# Patient Record
Sex: FEMALE | Race: WHITE | NOT HISPANIC OR LATINO | URBAN - METROPOLITAN AREA
[De-identification: names, ages, dates, MRNs, and addresses within clinical notes are randomized per-mention and may not be internally consistent; named-entity substitution may affect disease eponyms.]

---

## 2020-09-25 ENCOUNTER — EMERGENCY (EMERGENCY)
Facility: HOSPITAL | Age: 85
LOS: 0 days | Discharge: HOME | End: 2020-09-26
Attending: EMERGENCY MEDICINE | Admitting: STUDENT IN AN ORGANIZED HEALTH CARE EDUCATION/TRAINING PROGRAM
Payer: MEDICARE

## 2020-09-25 VITALS
SYSTOLIC BLOOD PRESSURE: 187 MMHG | TEMPERATURE: 99 F | OXYGEN SATURATION: 96 % | WEIGHT: 128.09 LBS | RESPIRATION RATE: 18 BRPM | HEIGHT: 59 IN | HEART RATE: 81 BPM | DIASTOLIC BLOOD PRESSURE: 84 MMHG

## 2020-09-25 VITALS
TEMPERATURE: 98 F | HEART RATE: 71 BPM | DIASTOLIC BLOOD PRESSURE: 68 MMHG | OXYGEN SATURATION: 97 % | SYSTOLIC BLOOD PRESSURE: 156 MMHG | RESPIRATION RATE: 18 BRPM

## 2020-09-25 DIAGNOSIS — R74.0 NONSPECIFIC ELEVATION OF LEVELS OF TRANSAMINASE AND LACTIC ACID DEHYDROGENASE [LDH]: ICD-10-CM

## 2020-09-25 DIAGNOSIS — R10.32 LEFT LOWER QUADRANT PAIN: ICD-10-CM

## 2020-09-25 DIAGNOSIS — R10.13 EPIGASTRIC PAIN: ICD-10-CM

## 2020-09-25 DIAGNOSIS — R10.9 UNSPECIFIED ABDOMINAL PAIN: ICD-10-CM

## 2020-09-25 DIAGNOSIS — Z98.890 OTHER SPECIFIED POSTPROCEDURAL STATES: ICD-10-CM

## 2020-09-25 DIAGNOSIS — R11.0 NAUSEA: ICD-10-CM

## 2020-09-25 LAB
ALBUMIN SERPL ELPH-MCNC: 4 G/DL — SIGNIFICANT CHANGE UP (ref 3.5–5.2)
ALP SERPL-CCNC: 244 U/L — HIGH (ref 30–115)
ALT FLD-CCNC: 447 U/L — HIGH (ref 0–41)
ANION GAP SERPL CALC-SCNC: 14 MMOL/L — SIGNIFICANT CHANGE UP (ref 7–14)
APPEARANCE UR: CLEAR — SIGNIFICANT CHANGE UP
AST SERPL-CCNC: 413 U/L — HIGH (ref 0–41)
BASOPHILS # BLD AUTO: 0.02 K/UL — SIGNIFICANT CHANGE UP (ref 0–0.2)
BASOPHILS NFR BLD AUTO: 0.3 % — SIGNIFICANT CHANGE UP (ref 0–1)
BILIRUB SERPL-MCNC: 4.7 MG/DL — HIGH (ref 0.2–1.2)
BILIRUB UR-MCNC: NEGATIVE — SIGNIFICANT CHANGE UP
BUN SERPL-MCNC: 16 MG/DL — SIGNIFICANT CHANGE UP (ref 10–20)
CALCIUM SERPL-MCNC: 9.4 MG/DL — SIGNIFICANT CHANGE UP (ref 8.5–10.1)
CHLORIDE SERPL-SCNC: 95 MMOL/L — LOW (ref 98–110)
CO2 SERPL-SCNC: 21 MMOL/L — SIGNIFICANT CHANGE UP (ref 17–32)
COLOR SPEC: YELLOW — SIGNIFICANT CHANGE UP
CREAT SERPL-MCNC: 0.8 MG/DL — SIGNIFICANT CHANGE UP (ref 0.7–1.5)
DIFF PNL FLD: NEGATIVE — SIGNIFICANT CHANGE UP
EOSINOPHIL # BLD AUTO: 0 K/UL — SIGNIFICANT CHANGE UP (ref 0–0.7)
EOSINOPHIL NFR BLD AUTO: 0 % — SIGNIFICANT CHANGE UP (ref 0–8)
GLUCOSE SERPL-MCNC: 127 MG/DL — HIGH (ref 70–99)
GLUCOSE UR QL: NEGATIVE — SIGNIFICANT CHANGE UP
HCT VFR BLD CALC: 39.3 % — SIGNIFICANT CHANGE UP (ref 37–47)
HGB BLD-MCNC: 13.1 G/DL — SIGNIFICANT CHANGE UP (ref 12–16)
IMM GRANULOCYTES NFR BLD AUTO: 0.3 % — SIGNIFICANT CHANGE UP (ref 0.1–0.3)
KETONES UR-MCNC: SIGNIFICANT CHANGE UP
LACTATE SERPL-SCNC: 1.5 MMOL/L — SIGNIFICANT CHANGE UP (ref 0.7–2)
LEUKOCYTE ESTERASE UR-ACNC: NEGATIVE — SIGNIFICANT CHANGE UP
LIDOCAIN IGE QN: 30 U/L — SIGNIFICANT CHANGE UP (ref 7–60)
LYMPHOCYTES # BLD AUTO: 0.88 K/UL — LOW (ref 1.2–3.4)
LYMPHOCYTES # BLD AUTO: 11.2 % — LOW (ref 20.5–51.1)
MCHC RBC-ENTMCNC: 29.2 PG — SIGNIFICANT CHANGE UP (ref 27–31)
MCHC RBC-ENTMCNC: 33.3 G/DL — SIGNIFICANT CHANGE UP (ref 32–37)
MCV RBC AUTO: 87.7 FL — SIGNIFICANT CHANGE UP (ref 81–99)
MONOCYTES # BLD AUTO: 0.77 K/UL — HIGH (ref 0.1–0.6)
MONOCYTES NFR BLD AUTO: 9.8 % — HIGH (ref 1.7–9.3)
NEUTROPHILS # BLD AUTO: 6.19 K/UL — SIGNIFICANT CHANGE UP (ref 1.4–6.5)
NEUTROPHILS NFR BLD AUTO: 78.4 % — HIGH (ref 42.2–75.2)
NITRITE UR-MCNC: NEGATIVE — SIGNIFICANT CHANGE UP
NRBC # BLD: 0 /100 WBCS — SIGNIFICANT CHANGE UP (ref 0–0)
PH UR: 7 — SIGNIFICANT CHANGE UP (ref 5–8)
PLATELET # BLD AUTO: 241 K/UL — SIGNIFICANT CHANGE UP (ref 130–400)
POTASSIUM SERPL-MCNC: 4.2 MMOL/L — SIGNIFICANT CHANGE UP (ref 3.5–5)
POTASSIUM SERPL-SCNC: 4.2 MMOL/L — SIGNIFICANT CHANGE UP (ref 3.5–5)
PROT SERPL-MCNC: 6.5 G/DL — SIGNIFICANT CHANGE UP (ref 6–8)
PROT UR-MCNC: NEGATIVE — SIGNIFICANT CHANGE UP
RBC # BLD: 4.48 M/UL — SIGNIFICANT CHANGE UP (ref 4.2–5.4)
RBC # FLD: 13.8 % — SIGNIFICANT CHANGE UP (ref 11.5–14.5)
SODIUM SERPL-SCNC: 130 MMOL/L — LOW (ref 135–146)
SP GR SPEC: 1.01 — SIGNIFICANT CHANGE UP (ref 1.01–1.03)
TROPONIN T SERPL-MCNC: <0.01 NG/ML — SIGNIFICANT CHANGE UP
UROBILINOGEN FLD QL: SIGNIFICANT CHANGE UP
WBC # BLD: 7.88 K/UL — SIGNIFICANT CHANGE UP (ref 4.8–10.8)
WBC # FLD AUTO: 7.88 K/UL — SIGNIFICANT CHANGE UP (ref 4.8–10.8)

## 2020-09-25 PROCEDURE — 99285 EMERGENCY DEPT VISIT HI MDM: CPT | Mod: GC

## 2020-09-25 PROCEDURE — 93010 ELECTROCARDIOGRAM REPORT: CPT

## 2020-09-25 PROCEDURE — 71045 X-RAY EXAM CHEST 1 VIEW: CPT | Mod: 26

## 2020-09-25 RX ORDER — SODIUM CHLORIDE 9 MG/ML
1000 INJECTION INTRAMUSCULAR; INTRAVENOUS; SUBCUTANEOUS ONCE
Refills: 0 | Status: COMPLETED | OUTPATIENT
Start: 2020-09-25 | End: 2020-09-25

## 2020-09-25 RX ORDER — KETOROLAC TROMETHAMINE 30 MG/ML
15 SYRINGE (ML) INJECTION ONCE
Refills: 0 | Status: DISCONTINUED | OUTPATIENT
Start: 2020-09-25 | End: 2020-09-25

## 2020-09-25 RX ORDER — ONDANSETRON 8 MG/1
4 TABLET, FILM COATED ORAL ONCE
Refills: 0 | Status: COMPLETED | OUTPATIENT
Start: 2020-09-25 | End: 2020-09-25

## 2020-09-25 RX ORDER — IOHEXOL 300 MG/ML
30 INJECTION, SOLUTION INTRAVENOUS ONCE
Refills: 0 | Status: COMPLETED | OUTPATIENT
Start: 2020-09-25 | End: 2020-09-25

## 2020-09-25 RX ADMIN — SODIUM CHLORIDE 1000 MILLILITER(S): 9 INJECTION INTRAMUSCULAR; INTRAVENOUS; SUBCUTANEOUS at 22:07

## 2020-09-25 RX ADMIN — IOHEXOL 30 MILLILITER(S): 300 INJECTION, SOLUTION INTRAVENOUS at 21:41

## 2020-09-25 RX ADMIN — SODIUM CHLORIDE 1000 MILLILITER(S): 9 INJECTION INTRAMUSCULAR; INTRAVENOUS; SUBCUTANEOUS at 21:41

## 2020-09-25 RX ADMIN — Medication 15 MILLIGRAM(S): at 21:40

## 2020-09-25 RX ADMIN — ONDANSETRON 4 MILLIGRAM(S): 8 TABLET, FILM COATED ORAL at 21:39

## 2020-09-25 RX ADMIN — Medication 15 MILLIGRAM(S): at 21:55

## 2020-09-25 NOTE — ED ADULT NURSE NOTE - NSIMPLEMENTINTERV_GEN_ALL_ED
Implemented All Fall with Harm Risk Interventions:  [place] to call system. Call bell, personal items and telephone within reach. Instruct patient to call for assistance. Room bathroom lighting operational. Non-slip footwear when patient is off stretcher. Physically safe environment: no spills, clutter or unnecessary equipment. Stretcher in lowest position, wheels locked, appropriate side rails in place. Provide visual cue, wrist band, yellow gown, etc. Monitor gait and stability. Monitor for mental status changes and reorient to person, place, and time. Review medications for side effects contributing to fall risk. Reinforce activity limits and safety measures with patient and family. Provide visual clues: red socks.

## 2020-09-25 NOTE — ED ADULT TRIAGE NOTE - CHIEF COMPLAINT QUOTE
pt sts has pain to abd starting last night, pt has colostomy for 32 yr with no issues. pt self irrigates and self irrigated last night and felt relief when fecal content emptied. pt sts again feeling pain today. and soft nondistended with minimal tenderness on palpation.

## 2020-09-25 NOTE — ED ADULT NURSE NOTE - OBJECTIVE STATEMENT
PT c/o abdominal pain and nausea since last night - pt has LLQ colostomy self irrigates and states after she irrigated this morning with some relief

## 2020-09-26 DIAGNOSIS — Z85.038 PERSONAL HISTORY OF OTHER MALIGNANT NEOPLASM OF LARGE INTESTINE: Chronic | ICD-10-CM

## 2020-09-26 DIAGNOSIS — Z93.3 COLOSTOMY STATUS: Chronic | ICD-10-CM

## 2020-09-26 PROCEDURE — 74177 CT ABD & PELVIS W/CONTRAST: CPT | Mod: 26

## 2020-09-26 RX ORDER — KETOROLAC TROMETHAMINE 30 MG/ML
1 SYRINGE (ML) INJECTION
Qty: 9 | Refills: 0
Start: 2020-09-26 | End: 2020-09-28

## 2020-09-26 NOTE — ED PROVIDER NOTE - PROVIDER TOKENS
PROVIDER:[TOKEN:[[id number]],FOLLOWUP:[1-3 Days]],PROVIDER:[TOKEN:[[id number]],FOLLOWUP:[1-3 Days]]

## 2020-09-26 NOTE — ED PROVIDER NOTE - CLINICAL SUMMARY MEDICAL DECISION MAKING FREE TEXT BOX
Pt presented with abd pain. Required labs, imaging. Was found to have transaminitis and dilation of intrahepatic ducts. Pt wants outpf f/up . Pain improved and pt is tolerating po.    ED evaluation and management discussed with the patient and family (if available) in detail.  Close PMD follow up encouraged.  Strict ED return instructions discussed in detail and patient given the opportunity to ask any questions about their discharge diagnosis and instructions. Patient verbalized understanding.

## 2020-09-26 NOTE — ED PROVIDER NOTE - OBJECTIVE STATEMENT
Patient is a 89 yo female with PMHx of hypothyroidism and colon cancer in remission s/p colostomy c/o abdominal pain since last night. The patient started with gradual onset epigastric and LLQ abdominal pain, sharp, moderate, constant. Has nausea, no vomiting. Denies fever, chills, chest pain, SOB, diarrhea, dysuria. Usually stool in colostomy is loose brown, but today was hard brown stool.

## 2020-09-26 NOTE — ED PROVIDER NOTE - CARE PROVIDERS DIRECT ADDRESSES
[email or web address]

## 2020-09-26 NOTE — ED PROVIDER NOTE - PROGRESS NOTE DETAILS
Tiff: Signout received from Dr. Reyes MQ: Labs show elevated liver enzymes and alk phos and CT shows dilation of biliary ducts, need to f/u with GI in the next couple of days for evaluation Ndubuisi: pt evaluated by me. Presented with abd pain. Labs and CT done and shows elevated LFTs and intraductal dilation of the biliary system. Pt and daughter does not want further ED evaluation in regards to RUQ US. They want to f/up outpt.   ED evaluation and management discussed with the patient and family (if available) in detail.  Close PMD follow up encouraged.  Strict ED return instructions discussed in detail and patient given the opportunity to ask any questions about their discharge diagnosis and instructions. Patient verbalized understanding.

## 2020-09-26 NOTE — ED PROVIDER NOTE - PHYSICAL EXAMINATION
CONSTITUTIONAL: Well-developed; well-nourished; in no acute distress. Laying on bed comfortably.  SKIN: warm, dry  HEAD: Normocephalic; atraumatic.  EYES: no conjunctival injection. PERRL.   ENT: No nasal discharge; airway clear.  NECK: Supple; non tender.  CARD: S1, S2 normal; no murmurs, gallops, or rubs. Regular rate and rhythm.   RESP: No wheezes, rales or rhonchi.  ABD: soft, tender in LLQ area, colostomy present in suprapubic area with no surrounding redness, pain, or exudates/pus, bag is empty, no rebound tenderness or guarding, no CVAT b/l  EXT: Normal ROM.  No clubbing, cyanosis or edema.   LYMPH: No acute cervical adenopathy.  NEURO: Alert, oriented, grossly unremarkable  PSYCH: Cooperative, appropriate.

## 2020-09-26 NOTE — ED PROVIDER NOTE - NSFOLLOWUPINSTRUCTIONS_ED_ALL_ED_FT
Abdominal Pain    Many things can cause abdominal pain. Many times, abdominal pain is not caused by a disease and will improve without treatment. Your health care provider will do a physical exam to determine if there is a dangerous cause of your pain; blood tests and imaging may help determine the cause of your pain. However, in many cases, no cause may be found and you may need further testing as an outpatient. Monitor your abdominal pain for any changes.     SEEK IMMEDIATE MEDICAL CARE IF YOU HAVE ANY OF THE FOLLOWING SYMPTOMS: worsening abdominal pain, uncontrollable vomiting, profuse diarrhea, inability to have bowel movements or pass gas, black or bloody stools, fever accompanying chest pain or back pain, or fainting. These symptoms may represent a serious problem that is an emergency. Do not wait to see if the symptoms will go away. Get medical help right away. Call 911 and do not drive yourself to the hospital.    Please follow up with a gastroenterologist in the next couple of days.

## 2020-09-26 NOTE — ED PROVIDER NOTE - PATIENT PORTAL LINK FT
You can access the FollowMyHealth Patient Portal offered by [facility] by registering at the following website: [email or web address]. By joining [employer]’s FollowMyHealth portal, you will also be able to view your health information using other applications (apps) compatible with our system.

## 2020-09-26 NOTE — ED PROVIDER NOTE - CARE PROVIDER_API CALL
Ray Quezada  GASTROENTEROLOGY  [address] [place] [address]  Phone: [phone]  Fax: [phone]  Follow Up Time: 1-3 Days    Max Bravo  GASTROENTEROLOGY  [address] [place] [address]  Phone: [phone]  Fax: [phone]  Follow Up Time: 1-3 Days

## 2020-09-27 LAB
CULTURE RESULTS: SIGNIFICANT CHANGE UP
SPECIMEN SOURCE: SIGNIFICANT CHANGE UP

## 2020-09-27 NOTE — ED POST DISCHARGE NOTE - DETAILS
Patient called, still feels some pain, taken medication given in ED with improvement in abdominal pain, now in contact with PMD and PMD will obtain her records, will f/u in next couple of days

## 2021-10-18 ENCOUNTER — APPOINTMENT (OUTPATIENT)
Age: 86
End: 2021-10-18

## 2021-10-18 ENCOUNTER — OUTPATIENT (OUTPATIENT)
Dept: INPATIENT UNIT | Facility: HOSPITAL | Age: 86
LOS: 1 days | Discharge: HOME | End: 2021-10-18

## 2021-10-18 ENCOUNTER — TRANSCRIPTION ENCOUNTER (OUTPATIENT)
Age: 86
End: 2021-10-18

## 2021-10-18 VITALS
TEMPERATURE: 99 F | HEART RATE: 64 BPM | RESPIRATION RATE: 17 BRPM | SYSTOLIC BLOOD PRESSURE: 128 MMHG | OXYGEN SATURATION: 97 % | DIASTOLIC BLOOD PRESSURE: 68 MMHG

## 2021-10-18 VITALS
OXYGEN SATURATION: 95 % | HEART RATE: 64 BPM | TEMPERATURE: 98 F | SYSTOLIC BLOOD PRESSURE: 138 MMHG | RESPIRATION RATE: 18 BRPM | DIASTOLIC BLOOD PRESSURE: 69 MMHG

## 2021-10-18 DIAGNOSIS — U07.1 COVID-19: ICD-10-CM

## 2021-10-18 DIAGNOSIS — Z93.3 COLOSTOMY STATUS: Chronic | ICD-10-CM

## 2021-10-18 DIAGNOSIS — Z85.038 PERSONAL HISTORY OF OTHER MALIGNANT NEOPLASM OF LARGE INTESTINE: Chronic | ICD-10-CM

## 2021-10-18 PROBLEM — E03.9 HYPOTHYROIDISM, UNSPECIFIED: Chronic | Status: ACTIVE | Noted: 2020-09-26

## 2021-10-18 RX ORDER — SODIUM CHLORIDE 9 MG/ML
250 INJECTION INTRAMUSCULAR; INTRAVENOUS; SUBCUTANEOUS
Refills: 0 | Status: COMPLETED | OUTPATIENT
Start: 2021-10-18 | End: 2021-10-18

## 2021-10-18 RX ADMIN — SODIUM CHLORIDE 310 MILLILITER(S): 9 INJECTION INTRAMUSCULAR; INTRAVENOUS; SUBCUTANEOUS at 14:27

## 2021-10-18 NOTE — CHART NOTE - NSCHARTNOTEFT_GEN_A_CORE
PMh HTN no cp, sob, cough, pleurisy, N/V, abdominal pain HYA diziness loss of sense of taste smell here for MAB infusion.      Was unvaccinated against covid, has 2 days of symptoms. Daughter and grand children are also +, lives with them.    Constitutional: (-) fever (+) chills (-) (-) lightheadedness   Eyes/ENT: (-) blurry vision, (-) epistaxis (-) rhinorrhea (-) nasal congestion  Cardiovascular: (-) chest pain, (-) syncope (-) palpitations   Respiratory: (-) cough, (-) shortness of breath (-) pleurisy   Gastrointestinal: (-) vomiting, (-) diarrhea (-) abdominal pain (-) nausea (-) anorexia  Musculoskeletal: (-) neck pain, (-) back pain, (-) joint pain (-) joint swelling (-) painful ROM  Integumentary: (-) rash, (-) edema (-) lacerations (-) pruritis   Neurological: (-) headache, (-) altered mental status (-) LOC (-) dizziness (-) paresthesias (-) gait abnormalities     Physical Exam    Vital Signs: I have reviewed the initial vital signs.  Constitutional: well-nourished, appears stated age, no acute distress  Eyes: Conjunctiva pink, Sclera clear, PERRLA, EOMI, no ptosis, no entrapment, no racoon eyes.    Cardiovascular: S1 and S2,  well-perfused extremities, radial pulses equal and 2+, calves nonttp, equal in size  Respiratory: unlabored respiratory effort, speaking in full sentences, handling oral secretions,clear to auscultation bilaterally no wheezing, rales and rhonchi  Gastrointestinal: soft, non-tender abdomen, no pulsatile mass, normal bowl sounds  Musculoskeletal: supple neck, no lower extremity edema,   Neurologic: awake, alert x3     informed consent obtained for tranfusion. Will monitor pt for 21 minutes. Pt [facility].

## 2021-10-19 ENCOUNTER — TRANSCRIPTION ENCOUNTER (OUTPATIENT)
Age: 86
End: 2021-10-19

## 2022-04-28 PROBLEM — Z00.00 ENCOUNTER FOR PREVENTIVE HEALTH EXAMINATION: Status: ACTIVE | Noted: 2022-04-28

## 2023-01-18 ENCOUNTER — INPATIENT (INPATIENT)
Facility: HOSPITAL | Age: 88
LOS: 7 days | Discharge: NURSING, REHAB OR LONG-TERM CARE | End: 2023-01-26
Attending: INTERNAL MEDICINE | Admitting: INTERNAL MEDICINE
Payer: MEDICARE

## 2023-01-18 VITALS
RESPIRATION RATE: 20 BRPM | HEART RATE: 107 BPM | OXYGEN SATURATION: 100 % | TEMPERATURE: 99 F | SYSTOLIC BLOOD PRESSURE: 127 MMHG | DIASTOLIC BLOOD PRESSURE: 80 MMHG

## 2023-01-18 DIAGNOSIS — Z85.038 PERSONAL HISTORY OF OTHER MALIGNANT NEOPLASM OF LARGE INTESTINE: Chronic | ICD-10-CM

## 2023-01-18 DIAGNOSIS — I26.99 OTHER PULMONARY EMBOLISM WITHOUT ACUTE COR PULMONALE: ICD-10-CM

## 2023-01-18 DIAGNOSIS — Z93.3 COLOSTOMY STATUS: Chronic | ICD-10-CM

## 2023-01-18 LAB
ALBUMIN SERPL ELPH-MCNC: 3 G/DL — LOW (ref 3.5–5.2)
ALP SERPL-CCNC: 135 U/L — HIGH (ref 30–115)
ALT FLD-CCNC: 61 U/L — HIGH (ref 0–41)
ANION GAP SERPL CALC-SCNC: 11 MMOL/L — SIGNIFICANT CHANGE UP (ref 7–14)
APTT BLD: 31.1 SEC — SIGNIFICANT CHANGE UP (ref 27–39.2)
AST SERPL-CCNC: 51 U/L — HIGH (ref 0–41)
BASE EXCESS BLDV CALC-SCNC: -0.3 MMOL/L — SIGNIFICANT CHANGE UP (ref -2–3)
BASOPHILS # BLD AUTO: 0.02 K/UL — SIGNIFICANT CHANGE UP (ref 0–0.2)
BASOPHILS NFR BLD AUTO: 0.2 % — SIGNIFICANT CHANGE UP (ref 0–1)
BILIRUB SERPL-MCNC: 0.4 MG/DL — SIGNIFICANT CHANGE UP (ref 0.2–1.2)
BLD GP AB SCN SERPL QL: SIGNIFICANT CHANGE UP
BUN SERPL-MCNC: 19 MG/DL — SIGNIFICANT CHANGE UP (ref 10–20)
CA-I SERPL-SCNC: 1.15 MMOL/L — SIGNIFICANT CHANGE UP (ref 1.15–1.33)
CALCIUM SERPL-MCNC: 8.5 MG/DL — SIGNIFICANT CHANGE UP (ref 8.4–10.5)
CHLORIDE SERPL-SCNC: 94 MMOL/L — LOW (ref 98–110)
CO2 SERPL-SCNC: 23 MMOL/L — SIGNIFICANT CHANGE UP (ref 17–32)
CREAT SERPL-MCNC: 0.7 MG/DL — SIGNIFICANT CHANGE UP (ref 0.7–1.5)
EGFR: 78 ML/MIN/1.73M2 — SIGNIFICANT CHANGE UP
EOSINOPHIL # BLD AUTO: 0.01 K/UL — SIGNIFICANT CHANGE UP (ref 0–0.7)
EOSINOPHIL NFR BLD AUTO: 0.1 % — SIGNIFICANT CHANGE UP (ref 0–8)
FLUAV AG NPH QL: SIGNIFICANT CHANGE UP
FLUBV AG NPH QL: SIGNIFICANT CHANGE UP
GAS PNL BLDV: 127 MMOL/L — LOW (ref 136–145)
GAS PNL BLDV: SIGNIFICANT CHANGE UP
GAS PNL BLDV: SIGNIFICANT CHANGE UP
GLUCOSE SERPL-MCNC: 105 MG/DL — HIGH (ref 70–99)
HCO3 BLDV-SCNC: 25 MMOL/L — SIGNIFICANT CHANGE UP (ref 22–29)
HCT VFR BLD CALC: 30 % — LOW (ref 37–47)
HCT VFR BLDA CALC: 32 % — LOW (ref 39–51)
HGB BLD CALC-MCNC: 10.5 G/DL — LOW (ref 12.6–17.4)
HGB BLD-MCNC: 9.7 G/DL — LOW (ref 12–16)
IMM GRANULOCYTES NFR BLD AUTO: 0.5 % — HIGH (ref 0.1–0.3)
INR BLD: 1.44 RATIO — HIGH (ref 0.65–1.3)
LACTATE BLDV-MCNC: 1.7 MMOL/L — SIGNIFICANT CHANGE UP (ref 0.5–2)
LACTATE SERPL-SCNC: 1.7 MMOL/L — SIGNIFICANT CHANGE UP (ref 0.7–2)
LYMPHOCYTES # BLD AUTO: 0.43 K/UL — LOW (ref 1.2–3.4)
LYMPHOCYTES # BLD AUTO: 5.3 % — LOW (ref 20.5–51.1)
MCHC RBC-ENTMCNC: 27.7 PG — SIGNIFICANT CHANGE UP (ref 27–31)
MCHC RBC-ENTMCNC: 32.3 G/DL — SIGNIFICANT CHANGE UP (ref 32–37)
MCV RBC AUTO: 85.7 FL — SIGNIFICANT CHANGE UP (ref 81–99)
MONOCYTES # BLD AUTO: 0.99 K/UL — HIGH (ref 0.1–0.6)
MONOCYTES NFR BLD AUTO: 12.2 % — HIGH (ref 1.7–9.3)
NEUTROPHILS # BLD AUTO: 6.64 K/UL — HIGH (ref 1.4–6.5)
NEUTROPHILS NFR BLD AUTO: 81.7 % — HIGH (ref 42.2–75.2)
NRBC # BLD: 0 /100 WBCS — SIGNIFICANT CHANGE UP (ref 0–0)
NT-PROBNP SERPL-SCNC: 1977 PG/ML — HIGH (ref 0–300)
PCO2 BLDV: 45 MMHG — HIGH (ref 39–42)
PH BLDV: 7.36 — SIGNIFICANT CHANGE UP (ref 7.32–7.43)
PLATELET # BLD AUTO: 375 K/UL — SIGNIFICANT CHANGE UP (ref 130–400)
PO2 BLDV: 27 MMHG — SIGNIFICANT CHANGE UP
POTASSIUM BLDV-SCNC: 5.2 MMOL/L — HIGH (ref 3.5–5.1)
POTASSIUM SERPL-MCNC: 5.6 MMOL/L — HIGH (ref 3.5–5)
POTASSIUM SERPL-SCNC: 5.6 MMOL/L — HIGH (ref 3.5–5)
PROT SERPL-MCNC: 6.3 G/DL — SIGNIFICANT CHANGE UP (ref 6–8)
PROTHROM AB SERPL-ACNC: 16.6 SEC — HIGH (ref 9.95–12.87)
RBC # BLD: 3.5 M/UL — LOW (ref 4.2–5.4)
RBC # FLD: 13.8 % — SIGNIFICANT CHANGE UP (ref 11.5–14.5)
RSV RNA NPH QL NAA+NON-PROBE: SIGNIFICANT CHANGE UP
SAO2 % BLDV: 31.8 % — SIGNIFICANT CHANGE UP
SARS-COV-2 RNA SPEC QL NAA+PROBE: SIGNIFICANT CHANGE UP
SODIUM SERPL-SCNC: 128 MMOL/L — LOW (ref 135–146)
TROPONIN T SERPL-MCNC: 0.13 NG/ML — CRITICAL HIGH
WBC # BLD: 8.13 K/UL — SIGNIFICANT CHANGE UP (ref 4.8–10.8)
WBC # FLD AUTO: 8.13 K/UL — SIGNIFICANT CHANGE UP (ref 4.8–10.8)

## 2023-01-18 PROCEDURE — 93010 ELECTROCARDIOGRAM REPORT: CPT

## 2023-01-18 PROCEDURE — 71045 X-RAY EXAM CHEST 1 VIEW: CPT | Mod: 26

## 2023-01-18 PROCEDURE — 71275 CT ANGIOGRAPHY CHEST: CPT | Mod: 26,MA

## 2023-01-18 PROCEDURE — 99291 CRITICAL CARE FIRST HOUR: CPT | Mod: FS

## 2023-01-18 RX ORDER — HEPARIN SODIUM 5000 [USP'U]/ML
4500 INJECTION INTRAVENOUS; SUBCUTANEOUS ONCE
Refills: 0 | Status: COMPLETED | OUTPATIENT
Start: 2023-01-18 | End: 2023-01-18

## 2023-01-18 RX ORDER — CEFTRIAXONE 500 MG/1
1000 INJECTION, POWDER, FOR SOLUTION INTRAMUSCULAR; INTRAVENOUS ONCE
Refills: 0 | Status: COMPLETED | OUTPATIENT
Start: 2023-01-18 | End: 2023-01-18

## 2023-01-18 RX ORDER — HEPARIN SODIUM 5000 [USP'U]/ML
2000 INJECTION INTRAVENOUS; SUBCUTANEOUS EVERY 6 HOURS
Refills: 0 | Status: DISCONTINUED | OUTPATIENT
Start: 2023-01-18 | End: 2023-01-19

## 2023-01-18 RX ORDER — AZITHROMYCIN 500 MG/1
500 TABLET, FILM COATED ORAL ONCE
Refills: 0 | Status: COMPLETED | OUTPATIENT
Start: 2023-01-18 | End: 2023-01-18

## 2023-01-18 RX ORDER — HEPARIN SODIUM 5000 [USP'U]/ML
4500 INJECTION INTRAVENOUS; SUBCUTANEOUS EVERY 6 HOURS
Refills: 0 | Status: DISCONTINUED | OUTPATIENT
Start: 2023-01-18 | End: 2023-01-19

## 2023-01-18 RX ORDER — CHLORHEXIDINE GLUCONATE 213 G/1000ML
1 SOLUTION TOPICAL
Refills: 0 | Status: DISCONTINUED | OUTPATIENT
Start: 2023-01-18 | End: 2023-01-26

## 2023-01-18 RX ORDER — HEPARIN SODIUM 5000 [USP'U]/ML
INJECTION INTRAVENOUS; SUBCUTANEOUS
Qty: 25000 | Refills: 0 | Status: DISCONTINUED | OUTPATIENT
Start: 2023-01-18 | End: 2023-01-19

## 2023-01-18 RX ADMIN — CEFTRIAXONE 100 MILLIGRAM(S): 500 INJECTION, POWDER, FOR SOLUTION INTRAMUSCULAR; INTRAVENOUS at 18:54

## 2023-01-18 RX ADMIN — HEPARIN SODIUM 4500 UNIT(S): 5000 INJECTION INTRAVENOUS; SUBCUTANEOUS at 23:12

## 2023-01-18 RX ADMIN — HEPARIN SODIUM 1100 UNIT(S)/HR: 5000 INJECTION INTRAVENOUS; SUBCUTANEOUS at 23:12

## 2023-01-18 RX ADMIN — AZITHROMYCIN 255 MILLIGRAM(S): 500 TABLET, FILM COATED ORAL at 18:54

## 2023-01-18 NOTE — H&P ADULT - HISTORY OF PRESENT ILLNESS
89 year old female history of colon CA in remission status post colostomy, hypothyroidism presents for shortness of breath. For about a week has had upper respiratory symptoms in addition to mild shortness of breath. Went to PCP who started on course of oral abx for suspected pneumonia. Today felt more short of breath so came to the ED. Was found to be satting in the lower 90's in the ED. Placed on 2L NC with improvement. CXR showed cardiomegaly and retro cardiac opacification. CT PE revealed PE with possible right heart strain. IR was consulted and recommend no intervention at this time. Dutton to continue with heparin ggt.      In the ED vitals T 99 , /80, SPO2 100% on 2L NC.  EKG sinus tachycardia   Labs show trops 0.13, BNP 1977, Hb 9.7 Na 128, K 5.6   CT PE: Acute pulmonary emboli throughout bilateral lobar and segmental pulmonary arteries (near complete occlusion of left lower lobe pulmonary arteries). RV:LV ratio greater than 1 with reflux of contrast to IVC, suspicious for right heart strain. Small bilateral pleural effusions with overlying compressive atelectasis. Left lower lobe opacification, which may represent combination of atelectasis and infarction, in the appropriate clinical   setting. Small pericardial effusion, measuring up to 1.4 cm in thickness.   89 year old female history of colon CA in remission status post colostomy, thyroid cancer s/p resection, hypothyroidism presents for shortness of breath. For about a week has had upper respiratory symptoms in addition to mild shortness of breath. Went to PCP who started on course of oral abx for suspected pneumonia. Today felt more short of breath so came to the ED. Was found to be satting in the lower 90's in the ED. Placed on 2L NC with improvement. CXR showed cardiomegaly and retro cardiac opacification. CT PE revealed PE with possible right heart strain. IR was consulted and recommend no intervention at this time. Dutton to continue with heparin ggt. Denies chest pain, abdominal pain, palpitations, nausea vomitting, numbness, tingling, headache, fever chills.    In the ED vitals T 99 , /80, SPO2 100% on 2L NC.  EKG sinus tachycardia   Labs show trops 0.13, BNP 1977, Hb 9.7 Na 128, K 5.6   CT PE: Acute pulmonary emboli throughout bilateral lobar and segmental pulmonary arteries (near complete occlusion of left lower lobe pulmonary arteries). RV:LV ratio greater than 1 with reflux of contrast to IVC, suspicious for right heart strain. Small bilateral pleural effusions with overlying compressive atelectasis. Left lower lobe opacification, which may represent combination of atelectasis and infarction, in the appropriate clinical   setting. Small pericardial effusion, measuring up to 1.4 cm in thickness.   89 year old female history of colon CA in remission status post colostomy, thyroid cancer s/p resection, hypothyroidism presents for shortness of breath. For about a week has had upper respiratory symptoms in addition to mild shortness of breath. About a month ago went to PCP who started on course of oral abx for suspected pneumonia, which she completed. yesterday she went to PCP again and ordered CXR and CT scan. Today felt more short of breath so came to the ED. Was found to be satting in the lower 90's in the ED. Placed on 2L NC with improvement. CXR showed cardiomegaly and retro cardiac opacification. CT PE revealed PE with possible right heart strain. IR was consulted and recommend no intervention at this time. Dutton to continue with heparin ggt. Denies chest pain, abdominal pain, palpitations, nausea vomitting, numbness, tingling, headache, fever chills.    In the ED vitals T 99 , /80, SPO2 100% on 2L NC.  EKG sinus tachycardia   Labs show trops 0.13, BNP 1977, Hb 9.7 Na 128, K 5.6   CT PE: Acute pulmonary emboli throughout bilateral lobar and segmental pulmonary arteries (near complete occlusion of left lower lobe pulmonary arteries). RV:LV ratio greater than 1 with reflux of contrast to IVC, suspicious for right heart strain. Small bilateral pleural effusions with overlying compressive atelectasis. Left lower lobe opacification, which may represent combination of atelectasis and infarction, in the appropriate clinical   setting. Small pericardial effusion, measuring up to 1.4 cm in thickness.

## 2023-01-18 NOTE — ED PROVIDER NOTE - OBJECTIVE STATEMENT
89-year-old female with past medical history of hypothyroid, colon cancer status post colostomy in remission presents to ED with shortness of breath and chest pain since this morning.  Patient admits has been having cough over the last month.  Had chest x-ray done by PMD and was prescribed 2 courses of antibiotics.  Patient denies fever, chills, abdominal pain

## 2023-01-18 NOTE — H&P ADULT - NSHPREVIEWOFSYSTEMS_GEN_ALL_CORE
REVIEW OF SYSTEMS:    CONSTITUTIONAL: No fever, weight loss, or fatigue  EYES: No eye pain, visual disturbances, or discharge  ENMT:  No difficulty hearing, tinnitus, vertigo; No sinus or throat pain  NECK: No pain or stiffness  BREASTS: No pain, masses, or nipple discharge  RESPIRATORY: see hpi  CARDIOVASCULAR: No chest pain, palpitations, dizziness, or leg swelling  GASTROINTESTINAL: No abdominal or epigastric pain. No nausea, vomiting, or hematemesis; No diarrhea or constipation. No melena or hematochezia.  GENITOURINARY: No dysuria, frequency, hematuria, or incontinence  NEUROLOGICAL: No headaches, memory loss, loss of strength, numbness, or tremors  SKIN: No itching, burning, rashes, or lesions   LYMPH NODES: No enlarged glands  ENDOCRINE: No heat or cold intolerance; No hair loss  MUSCULOSKELETAL: No joint pain or swelling; No muscle, back, or extremity pain  PSYCHIATRIC: No depression, anxiety, mood swings, or difficulty sleeping  HEME/LYMPH: No easy bruising, or bleeding gums  ALLERGY AND IMMUNOLOGIC: No hives or eczema

## 2023-01-18 NOTE — H&P ADULT - ASSESSMENT
IMPRESSION:  PE +/- R heart strain  normocytic anemia  hyponatremia   hyperkalemia     PLAN:    CNS:    HEENT: Oral care    PULMONARY:  HOB @ 45 degrees.  Aspiration precautions. supplemental oxygen PRN    CARDIOVASCULAR: Echo. trend trops. Monitor hemodynamic status closely     GI: GI prophylaxis.  Feeding.  Bowel regimen. no obvious signs of bleed    RENAL:  Follow up lytes.  Correct as needed    INFECTIOUS DISEASE: Follow up cultures    HEMATOLOGICAL:  continue with heparin ggt. Monitor PTT adjust per normogram. duplex. Monitor h/h.   ENDOCRINE:  Follow up FS.  Insulin protocol if needed.    MUSCULOSKELETAL: bedrest         IMPRESSION:  PE +/- R heart strain  Left lower lobe opacification  normocytic anemia  hyponatremia   hyperkalemia   Hx of colon cancer s/p resection and colostomy  Hx thyroid cancer s/p resection, hypothyroidism     PLAN:    CNS:    HEENT: Oral care    PULMONARY:  HOB @ 45 degrees.  Aspiration precautions. supplemental oxygen PRN    CARDIOVASCULAR: Echo. trend trops. Monitor hemodynamic status closely     GI: GI prophylaxis.  Feeding.  Bowel regimen. no obvious signs of bleed    RENAL:  Follow up lytes.  Correct as needed    INFECTIOUS DISEASE: Follow up cultures. Monitor off abx     HEMATOLOGICAL:  continue with heparin ggt. Monitor PTT adjust per normogram. duplex. Monitor h/h.     ENDOCRINE:  Follow up FS.  Insulin protocol if needed. C/w synthroid 100mcg    MUSCULOSKELETAL: bedrest         IMPRESSION:  PE +/- R heart strain  Left lower lobe opacification ?pulmonary infarct   normocytic anemia  hyponatremia   hyperkalemia   Hx of colon cancer s/p resection and colostomy  Hx thyroid cancer s/p resection, hypothyroidism     PLAN:    CNS:    HEENT: Oral care    PULMONARY:  HOB @ 45 degrees.  Aspiration precautions. supplemental oxygen PRN    CARDIOVASCULAR: Echo. trend trops. Monitor hemodynamic status closely     GI: GI prophylaxis.  Feeding.  Bowel regimen. no obvious signs of bleed    RENAL:  Follow up lytes.  Correct as needed    INFECTIOUS DISEASE: Follow up cultures. Monitor off abx     HEMATOLOGICAL:  continue with heparin ggt. Monitor PTT adjust per normogram. duplex. Monitor h/h. Transfuse as needed    ENDOCRINE:  Follow up FS.  Insulin protocol if needed. C/w synthroid 100mcg    MUSCULOSKELETAL: bedrest

## 2023-01-18 NOTE — ED PROVIDER NOTE - PROGRESS NOTE DETAILS
Pts oxygen 93% on RA< 100 % on 2 L NC spoke with icu fellow, recommends admission to ICU - with heparin drop.. pt and family aware and agreeable to plan

## 2023-01-18 NOTE — ED PROVIDER NOTE - NS ED ROS FT
Review of Systems:  	•	CONSTITUTIONAL - no fever, no diaphoresis, no chills  	•	SKIN - no rash  	•	HEMATOLOGIC - no bleeding, no bruising  	•	EYES - no eye pain, no blurry vision  	•	ENT - no change in hearing, no sore throat, no ear pain or tinnitus  	•	RESPIRATORY - + shortness of breath, no cough  	•	CARDIAC - + chest pain, no palpitations  	•	GI - no abd pain, no nausea, no vomiting, no diarrhea, no constipation  	•	GENITO-URINARY - no discharge, no dysuria; no hematuria, no increased urinary frequency  	•	MUSCULOSKELETAL - no joint paint, no swelling, no redness  	•	NEUROLOGIC - no weakness, no headache, no paresthesias, no LOC  	•	PSYCH - no anxiety, non suicidal, non homicidal, no hallucination, no depression

## 2023-01-18 NOTE — H&P ADULT - NSHPPHYSICALEXAM_GEN_ALL_CORE
VITALS:   T(C): 37.2 (01-18-23 @ 15:39), Max: 37.2 (01-18-23 @ 15:39)  HR: 98 (01-18-23 @ 20:05) (98 - 107)  BP: 142/88 (01-18-23 @ 20:05) (127/80 - 142/88)  RR: 18 (01-18-23 @ 20:05) (18 - 20)  SpO2: 98% (01-18-23 @ 20:05) (98% - 100%)    GENERAL: NAD, lying in bed comfortably  HEAD:  Atraumatic, normocephalic  EYES: EOMI, PERRLA, conjunctiva and sclera clear  ENT: Moist mucous membranes  NECK: Supple, no JVD  HEART: Regular rate and rhythm, no murmurs, rubs, or gallops  LUNGS: Unlabored respirations.  Clear to auscultation bilaterally, no crackles, wheezing, or rhonchi  ABDOMEN: Soft, nontender, nondistended, +BS  EXTREMITIES: 2+ peripheral pulses bilaterally. No clubbing, cyanosis, or edema  NERVOUS SYSTEM:  A&Ox3, no focal deficits   SKIN: No rashes or lesions

## 2023-01-18 NOTE — H&P ADULT - NSHPLABSRESULTS_GEN_ALL_CORE
LABS/RADIOLOGY RESULTS:                          9.7    8.13  )-----------( 375      ( 18 Jan 2023 16:58 )             30.0   01-18    128<L>  |  94<L>  |  19  ----------------------------<  105<H>  5.6<H>   |  23  |  0.7    Ca    8.5      18 Jan 2023 16:58    TPro  6.3  /  Alb  3.0<L>  /  TBili  0.4  /  DBili  x   /  AST  51<H>  /  ALT  61<H>  /  AlkPhos  135<H>  01-18  Blood Cultures

## 2023-01-18 NOTE — ED PROVIDER NOTE - CLINICAL SUMMARY MEDICAL DECISION MAKING FREE TEXT BOX
89-year-old female history of colon CA in remission status post colostomy, hypothyroidism presenting for evaluation of shortness of breath with associated chest discomfort getting progressively worse.  States that she has finished multiple courses of antibiotics for pneumonia outpatient with no improvement.  Today was found to be hypoxic to the low 90s by 9 minutes.  Placed on nasal cannula with improvement.  No other acute complaints.  No lower extremity pain or swelling beyond her baseline.  Chronically ill appearing, NAD, non toxic. NCAT PERRLA EOMI neck supple non tender normal wob cta bl regular, tachycardic abdomen s nt nd no rebound no guarding [id number] neuro non focal left greater than right lower extremity swelling, per patient baseline.  No calf tenderness bilaterally.  Labs imaging EKG reviewed.  Patient found to have acute PE with right heart strain.  Discussed with ICU, PERT team activated.  Heparin drip started.  Will admit to ICU for further evaluation.

## 2023-01-18 NOTE — ED ADULT TRIAGE NOTE - CHIEF COMPLAINT QUOTE
Patient bibems a+ox3 co SOB since yesterday. As per EMS and daughter in law pt recently treated for pneumonia and finished antibiotics yesterday. As per EMS SPO2 93% on RA. SpO2 100% on 2L nc.

## 2023-01-19 LAB
ALBUMIN SERPL ELPH-MCNC: 2.8 G/DL — LOW (ref 3.5–5.2)
ALP SERPL-CCNC: 125 U/L — HIGH (ref 30–115)
ALT FLD-CCNC: 48 U/L — HIGH (ref 0–41)
ANION GAP SERPL CALC-SCNC: 13 MMOL/L — SIGNIFICANT CHANGE UP (ref 7–14)
ANION GAP SERPL CALC-SCNC: 13 MMOL/L — SIGNIFICANT CHANGE UP (ref 7–14)
APTT BLD: 108 SEC — CRITICAL HIGH (ref 27–39.2)
APTT BLD: 93.6 SEC — CRITICAL HIGH (ref 27–39.2)
AST SERPL-CCNC: 36 U/L — SIGNIFICANT CHANGE UP (ref 0–41)
BASOPHILS # BLD AUTO: 0.02 K/UL — SIGNIFICANT CHANGE UP (ref 0–0.2)
BASOPHILS NFR BLD AUTO: 0.3 % — SIGNIFICANT CHANGE UP (ref 0–1)
BILIRUB SERPL-MCNC: 0.3 MG/DL — SIGNIFICANT CHANGE UP (ref 0.2–1.2)
BUN SERPL-MCNC: 15 MG/DL — SIGNIFICANT CHANGE UP (ref 10–20)
BUN SERPL-MCNC: 15 MG/DL — SIGNIFICANT CHANGE UP (ref 10–20)
CALCIUM SERPL-MCNC: 8.1 MG/DL — LOW (ref 8.4–10.5)
CALCIUM SERPL-MCNC: 8.4 MG/DL — SIGNIFICANT CHANGE UP (ref 8.4–10.5)
CHLORIDE SERPL-SCNC: 95 MMOL/L — LOW (ref 98–110)
CHLORIDE SERPL-SCNC: 98 MMOL/L — SIGNIFICANT CHANGE UP (ref 98–110)
CO2 SERPL-SCNC: 23 MMOL/L — SIGNIFICANT CHANGE UP (ref 17–32)
CO2 SERPL-SCNC: 23 MMOL/L — SIGNIFICANT CHANGE UP (ref 17–32)
CREAT SERPL-MCNC: 0.6 MG/DL — LOW (ref 0.7–1.5)
CREAT SERPL-MCNC: 0.7 MG/DL — SIGNIFICANT CHANGE UP (ref 0.7–1.5)
EGFR: 78 ML/MIN/1.73M2 — SIGNIFICANT CHANGE UP
EGFR: 81 ML/MIN/1.73M2 — SIGNIFICANT CHANGE UP
EOSINOPHIL # BLD AUTO: 0.04 K/UL — SIGNIFICANT CHANGE UP (ref 0–0.7)
EOSINOPHIL NFR BLD AUTO: 0.5 % — SIGNIFICANT CHANGE UP (ref 0–8)
FERRITIN SERPL-MCNC: 793 NG/ML — HIGH (ref 15–150)
FOLATE SERPL-MCNC: 5 NG/ML — SIGNIFICANT CHANGE UP
GLUCOSE SERPL-MCNC: 101 MG/DL — HIGH (ref 70–99)
GLUCOSE SERPL-MCNC: 109 MG/DL — HIGH (ref 70–99)
HCT VFR BLD CALC: 31 % — LOW (ref 37–47)
HCT VFR BLD CALC: 31.8 % — LOW (ref 37–47)
HGB BLD-MCNC: 10 G/DL — LOW (ref 12–16)
HGB BLD-MCNC: 10 G/DL — LOW (ref 12–16)
IMM GRANULOCYTES NFR BLD AUTO: 0.4 % — HIGH (ref 0.1–0.3)
IRON SATN MFR SERPL: 17 % — SIGNIFICANT CHANGE UP (ref 15–50)
IRON SATN MFR SERPL: 27 UG/DL — LOW (ref 35–150)
LYMPHOCYTES # BLD AUTO: 0.8 K/UL — LOW (ref 1.2–3.4)
LYMPHOCYTES # BLD AUTO: 10 % — LOW (ref 20.5–51.1)
MCHC RBC-ENTMCNC: 27.2 PG — SIGNIFICANT CHANGE UP (ref 27–31)
MCHC RBC-ENTMCNC: 27.3 PG — SIGNIFICANT CHANGE UP (ref 27–31)
MCHC RBC-ENTMCNC: 31.4 G/DL — LOW (ref 32–37)
MCHC RBC-ENTMCNC: 32.3 G/DL — SIGNIFICANT CHANGE UP (ref 32–37)
MCV RBC AUTO: 84.7 FL — SIGNIFICANT CHANGE UP (ref 81–99)
MCV RBC AUTO: 86.4 FL — SIGNIFICANT CHANGE UP (ref 81–99)
MONOCYTES # BLD AUTO: 1.1 K/UL — HIGH (ref 0.1–0.6)
MONOCYTES NFR BLD AUTO: 13.8 % — HIGH (ref 1.7–9.3)
NEUTROPHILS # BLD AUTO: 5.99 K/UL — SIGNIFICANT CHANGE UP (ref 1.4–6.5)
NEUTROPHILS NFR BLD AUTO: 75 % — SIGNIFICANT CHANGE UP (ref 42.2–75.2)
NRBC # BLD: 0 /100 WBCS — SIGNIFICANT CHANGE UP (ref 0–0)
NRBC # BLD: 0 /100 WBCS — SIGNIFICANT CHANGE UP (ref 0–0)
PLATELET # BLD AUTO: 412 K/UL — HIGH (ref 130–400)
PLATELET # BLD AUTO: 427 K/UL — HIGH (ref 130–400)
POTASSIUM SERPL-MCNC: 4.9 MMOL/L — SIGNIFICANT CHANGE UP (ref 3.5–5)
POTASSIUM SERPL-MCNC: 5.2 MMOL/L — HIGH (ref 3.5–5)
POTASSIUM SERPL-SCNC: 4.9 MMOL/L — SIGNIFICANT CHANGE UP (ref 3.5–5)
POTASSIUM SERPL-SCNC: 5.2 MMOL/L — HIGH (ref 3.5–5)
PROCALCITONIN SERPL-MCNC: 0.18 NG/ML — HIGH (ref 0.02–0.1)
PROT SERPL-MCNC: 5.7 G/DL — LOW (ref 6–8)
RBC # BLD: 3.66 M/UL — LOW (ref 4.2–5.4)
RBC # BLD: 3.68 M/UL — LOW (ref 4.2–5.4)
RBC # FLD: 13.9 % — SIGNIFICANT CHANGE UP (ref 11.5–14.5)
RBC # FLD: 14 % — SIGNIFICANT CHANGE UP (ref 11.5–14.5)
SODIUM SERPL-SCNC: 131 MMOL/L — LOW (ref 135–146)
SODIUM SERPL-SCNC: 134 MMOL/L — LOW (ref 135–146)
TIBC SERPL-MCNC: 162 UG/DL — LOW (ref 220–430)
TROPONIN T SERPL-MCNC: 0.1 NG/ML — CRITICAL HIGH
TROPONIN T SERPL-MCNC: 0.11 NG/ML — CRITICAL HIGH
UIBC SERPL-MCNC: 135 UG/DL — SIGNIFICANT CHANGE UP (ref 110–370)
VIT B12 SERPL-MCNC: 1427 PG/ML — HIGH (ref 232–1245)
WBC # BLD: 7.98 K/UL — SIGNIFICANT CHANGE UP (ref 4.8–10.8)
WBC # BLD: 8.22 K/UL — SIGNIFICANT CHANGE UP (ref 4.8–10.8)
WBC # FLD AUTO: 7.98 K/UL — SIGNIFICANT CHANGE UP (ref 4.8–10.8)
WBC # FLD AUTO: 8.22 K/UL — SIGNIFICANT CHANGE UP (ref 4.8–10.8)

## 2023-01-19 PROCEDURE — 99223 1ST HOSP IP/OBS HIGH 75: CPT

## 2023-01-19 PROCEDURE — 93970 EXTREMITY STUDY: CPT | Mod: 26

## 2023-01-19 PROCEDURE — 71045 X-RAY EXAM CHEST 1 VIEW: CPT | Mod: 26

## 2023-01-19 RX ORDER — LEVOTHYROXINE SODIUM 125 MCG
1 TABLET ORAL
Qty: 0 | Refills: 0 | DISCHARGE

## 2023-01-19 RX ORDER — SODIUM ZIRCONIUM CYCLOSILICATE 10 G/10G
5 POWDER, FOR SUSPENSION ORAL ONCE
Refills: 0 | Status: COMPLETED | OUTPATIENT
Start: 2023-01-19 | End: 2023-01-19

## 2023-01-19 RX ORDER — LEVOTHYROXINE SODIUM 125 MCG
100 TABLET ORAL DAILY
Refills: 0 | Status: DISCONTINUED | OUTPATIENT
Start: 2023-01-19 | End: 2023-01-26

## 2023-01-19 RX ORDER — ENOXAPARIN SODIUM 100 MG/ML
60 INJECTION SUBCUTANEOUS EVERY 12 HOURS
Refills: 0 | Status: DISCONTINUED | OUTPATIENT
Start: 2023-01-19 | End: 2023-01-21

## 2023-01-19 RX ADMIN — ENOXAPARIN SODIUM 60 MILLIGRAM(S): 100 INJECTION SUBCUTANEOUS at 17:35

## 2023-01-19 RX ADMIN — HEPARIN SODIUM 1000 UNIT(S)/HR: 5000 INJECTION INTRAVENOUS; SUBCUTANEOUS at 07:18

## 2023-01-19 RX ADMIN — SODIUM ZIRCONIUM CYCLOSILICATE 5 GRAM(S): 10 POWDER, FOR SUSPENSION ORAL at 13:00

## 2023-01-19 RX ADMIN — CHLORHEXIDINE GLUCONATE 1 APPLICATION(S): 213 SOLUTION TOPICAL at 08:00

## 2023-01-19 RX ADMIN — Medication 100 MICROGRAM(S): at 05:51

## 2023-01-19 NOTE — CONSULT NOTE ADULT - ASSESSMENT
IMPRESSION:    Bilateral PE unprovoked  HO colon Ca and Thyroid Ca  Bilateral pleural effusions   Small pericardial effusion     PLAN:    CNS:  No depressants     HEENT: Oral care    PULMONARY:  HOB @ 45 degrees.  Aspiration precautions.  Wean O2 as tolerated.      CARDIOVASCULAR:  Echo.  Avoid overload.      GI: GI prophylaxis.  Feeding.  Bowel regimen     RENAL:  Follow up lytes.  Correct as needed    INFECTIOUS DISEASE: Follow up cultures.  Monitor off ABX for now     HEMATOLOGICAL:  DVT therapy.  LE duplex.  LMWH for now     ENDOCRINE:  Follow up FS.  Insulin protocol if needed.    MUSCULOSKELETAL:  Bed rest 24 hours.    SDU

## 2023-01-19 NOTE — CONSULT NOTE ADULT - SUBJECTIVE AND OBJECTIVE BOX
Patient is a 89y old  Female who presents with a chief complaint of PE (18 Jan 2023 20:51)      HPI:  89 year old female history of colon CA in remission status post colostomy, thyroid cancer s/p resection, hypothyroidism presents for shortness of breath. For about a week has had upper respiratory symptoms in addition to mild shortness of breath. About a month ago went to PCP who started on course of oral abx for suspected pneumonia, which she completed. yesterday she went to PCP again and ordered CXR and CT scan. Today felt more short of breath so came to the ED. Was found to be satting in the lower 90's in the ED. Placed on 2L NC with improvement. CXR showed cardiomegaly and retro cardiac opacification. CT PE revealed PE with possible right heart strain. IR was consulted and recommend no intervention at this time. Dutton to continue with heparin ggt. Denies chest pain, abdominal pain, palpitations, nausea vomitting, numbness, tingling, headache, fever chills.    In the ED vitals T 99 , /80, SPO2 100% on 2L NC.  EKG sinus tachycardia   Labs show trops 0.13, BNP 1977, Hb 9.7 Na 128, K 5.6   CT PE: Acute pulmonary emboli throughout bilateral lobar and segmental pulmonary arteries (near complete occlusion of left lower lobe pulmonary arteries). RV:LV ratio greater than 1 with reflux of contrast to IVC, suspicious for right heart strain. Small bilateral pleural effusions with overlying compressive atelectasis. Left lower lobe opacification, which may represent combination of atelectasis and infarction, in the appropriate clinical   setting. Small pericardial effusion, measuring up to 1.4 cm in thickness.   (18 Jan 2023 20:51)      PAST MEDICAL & SURGICAL HISTORY:  Hypothyroidism      History of colon cancer      Colostomy present          SOCIAL HX:   Smoking    NO                     ETOH                            Other    FAMILY HISTORY:  :  No known cardiovacular family hisotry     Review Of Systems:     All ROS are negative except per HPI       Allergies    No Known Allergies    Intolerances          PHYSICAL EXAM    ICU Vital Signs Last 24 Hrs  T(C): 37.2 (18 Jan 2023 15:39), Max: 37.2 (18 Jan 2023 15:39)  T(F): 99 (18 Jan 2023 15:39), Max: 99 (18 Jan 2023 15:39)  HR: 80 (19 Jan 2023 04:00) (80 - 107)  BP: 131/101 (19 Jan 2023 04:00) (127/80 - 154/75)  BP(mean): 113 (19 Jan 2023 04:00) (104 - 113)  ABP: --  ABP(mean): --  RR: 16 (19 Jan 2023 04:00) (16 - 20)  SpO2: 99% (19 Jan 2023 04:00) (95% - 100%)    O2 Parameters below as of 19 Jan 2023 04:00  Patient On (Oxygen Delivery Method): nasal cannula  O2 Flow (L/min): 2          CONSTITUTIONAL:  In NAD    ENT:   Airway patent,   Mouth with normal mucosa.       CARDIAC:   Normal rate,   Regular rhythm.    No edema      RESPIRATORY:   No wheezing  Bilateral BS   Not tachypneic,  No use of accessory muscles    GASTROINTESTINAL:  Abdomen soft,   Non-tender,   No guarding,   + BS      NEUROLOGICAL:   Alert   Follows simple commands   No motor deficits.    SKIN:   Skin normal color for race,   No evidence of rash.                  LABS:                          10.0   8.22  )-----------( 412      ( 19 Jan 2023 01:17 )             31.0                                               01-19    131<L>  |  95<L>  |  15  ----------------------------<  101<H>  4.9   |  23  |  0.7    Ca    8.1<L>      19 Jan 2023 01:17    TPro  6.3  /  Alb  3.0<L>  /  TBili  0.4  /  DBili  x   /  AST  51<H>  /  ALT  61<H>  /  AlkPhos  135<H>  01-18      PT/INR - ( 18 Jan 2023 20:52 )   PT: 16.60 sec;   INR: 1.44 ratio         PTT - ( 19 Jan 2023 04:50 )  PTT:108.0 sec                                           CARDIAC MARKERS ( 19 Jan 2023 01:17 )  x     / 0.10 ng/mL / x     / x     / x      CARDIAC MARKERS ( 18 Jan 2023 16:58 )  x     / 0.13 ng/mL / x     / x     / x                                                LIVER FUNCTIONS - ( 18 Jan 2023 16:58 )  Alb: 3.0 g/dL / Pro: 6.3 g/dL / ALK PHOS: 135 U/L / ALT: 61 U/L / AST: 51 U/L / GGT: x                                                                                                                                       X-Rays reviewed                                                                                     ECHO        MEDICATIONS  (STANDING):  chlorhexidine 4% Liquid 1 Application(s) Topical <User Schedule>  heparin  Infusion.  Unit(s)/Hr (11 mL/Hr) IV Continuous <Continuous>  levothyroxine 100 MICROGram(s) Oral daily  sodium zirconium cyclosilicate 5 Gram(s) Oral once    MEDICATIONS  (PRN):  heparin   Injectable 4500 Unit(s) IV Push every 6 hours PRN For aPTT less than 40  heparin   Injectable 2000 Unit(s) IV Push every 6 hours PRN For aPTT between 40 - 57        
INTERVENTIONAL RADIOLOGY CONSULT:     Procedure Requested:     HPI: 89yo F w/ Hx of Hypothyroid and Colon Cancer today with exacerbated SOB found to have bilateral PE on CTA, positive troponin.       PAST MEDICAL & SURGICAL HISTORY:  Hypothyroidism      History of colon cancer      Colostomy present          MEDICATIONS  (STANDING):    MEDICATIONS  (PRN):      Allergies    No Known Allergies    Intolerances        Social History:   Smoking: Yes [ ]  No [ ]   ______pk yrs  ETOH  Yes [ ]  No [ ]  Social [ ]  DRUGS:  Yes [ ]  No [ ]  if so what______________    FAMILY HISTORY:      Physical Exam:   Vital Signs Last 24 Hrs  T(C): 37.2 (18 Jan 2023 15:39), Max: 37.2 (18 Jan 2023 15:39)  T(F): 99 (18 Jan 2023 15:39), Max: 99 (18 Jan 2023 15:39)  HR: 107 (18 Jan 2023 15:39) (107 - 107)  BP: 127/80 (18 Jan 2023 15:39) (127/80 - 127/80)  BP(mean): --  RR: 20 (18 Jan 2023 15:39) (20 - 20)  SpO2: 100% (18 Jan 2023 15:39) (100% - 100%)    Parameters below as of 18 Jan 2023 15:39  Patient On (Oxygen Delivery Method): nasal cannula  O2 Flow (L/min): 2      General:     Lungs:    Cardiovascular:     Abdomen:    Extremities:    Musculoskeletal:    Neuro/Psych:        Labs:                         9.7    8.13  )-----------( 375      ( 18 Jan 2023 16:58 )             30.0     01-18    128<L>  |  94<L>  |  19  ----------------------------<  105<H>  5.6<H>   |  23  |  0.7    Ca    8.5      18 Jan 2023 16:58    TPro  6.3  /  Alb  3.0<L>  /  TBili  0.4  /  DBili  x   /  AST  51<H>  /  ALT  61<H>  /  AlkPhos  135<H>  01-18        Pertinent labs:                      9.7    8.13  )-----------( 375      ( 18 Jan 2023 16:58 )             30.0       01-18    128<L>  |  94<L>  |  19  ----------------------------<  105<H>  5.6<H>   |  23  |  0.7    Ca    8.5      18 Jan 2023 16:58    TPro  6.3  /  Alb  3.0<L>  /  TBili  0.4  /  DBili  x   /  AST  51<H>  /  ALT  61<H>  /  AlkPhos  135<H>  01-18          Radiology & Additional Studies:     Radiology imaging reviewed.       ASSESSMENT/ PLAN:    89yo F w/ Hx of Hypothyroid and Colon Cancer today with exacerbated SOB found to have bilateral PE on CTA, positive troponin. Question of RHS on CTA however there is no bowing of the septum.     - Recommend therapeutic anticoagulation  - No intervention at this time.  - Care as per ICU team.

## 2023-01-19 NOTE — CHART NOTE - NSCHARTNOTEFT_GEN_A_CORE
MICU Transfer Note    Transfer from: MICU  Transfer to:  (  ) Medicine    (  ) Telemetry    (  ) RCU    (  ) Palliative    (  ) Stroke Unit    ( X ) __SDU_____________      MICU COURSE:      HPI:  89 year old female history of colon CA in remission status post colostomy, thyroid cancer s/p resection, hypothyroidism presents for shortness of breath. For about a week has had upper respiratory symptoms in addition to mild shortness of breath. About a month ago went to PCP who started on course of oral abx for suspected pneumonia, which she completed. yesterday she went to PCP again and ordered CXR and CT scan. Today felt more short of breath so came to the ED. Was found to be satting in the lower 90's in the ED. Placed on 2L NC with improvement. CXR showed cardiomegaly and retro cardiac opacification. CT PE revealed PE with possible right heart strain. IR was consulted and recommend no intervention at this time. Dutton to continue with heparin ggt. Denies chest pain, abdominal pain, palpitations, nausea vomitting, numbness, tingling, headache, fever chills.    In the ED vitals T 99 , /80, SPO2 100% on 2L NC.  EKG sinus tachycardia   Labs show trops 0.13, BNP 1977, Hb 9.7 Na 128, K 5.6   CT PE: Acute pulmonary emboli throughout bilateral lobar and segmental pulmonary arteries (near complete occlusion of left lower lobe pulmonary arteries). RV:LV ratio greater than 1 with reflux of contrast to IVC, suspicious for right heart strain. Small bilateral pleural effusions with overlying compressive atelectasis. Left lower lobe opacification, which may represent combination of atelectasis and infarction, in the appropriate clinical   setting. Small pericardial effusion, measuring up to 1.4 cm in thickness.   (18 Jan 2023 20:51)    Admitted to ICU for 1 day; seen by IR no acute intervention, started on LMWH on bilateral segmental PE with possible of right heart strain.     Assessment   89 year old female history of colon CA in remission status post colostomy, thyroid cancer s/p resection, hypothyroidism presents for shortness of breath. For about a week has had upper respiratory symptoms in addition to mild shortness of breath. About a month ago went to PCP who started on course of oral abx for suspected pneumonia, which she completed admitted for bilateral PE with possible right heart strain.     #Bilateral PE unprovoked  - seen by IR no acute intervention, started on LMWH on bilateral segmental PE with possible of right heart strain.   -  c/w LMWH   - f/u ECHO  - f/u [facility] duplex    #HO colon Ca and Thyroid Ca  #Bilateral pleural effusions   #Small pericardial effusion           For Follow-Up:  - c/w LMWH   - f/u ECHO  - f/u [facility] duplex        Vital Signs Last 24 Hrs  T(C): 36.3 (19 Jan 2023 07:41), Max: 37.2 (18 Jan 2023 15:39)  T(F): 97.4 (19 Jan 2023 07:41), Max: 99 (18 Jan 2023 15:39)  HR: 77 (19 Jan 2023 10:00) (77 - 107)  BP: 127/94 (19 Jan 2023 10:00) (118/68 - 154/75)  BP(mean): 113 (19 Jan 2023 04:00) (104 - 113)  RR: 17 (19 Jan 2023 10:00) (16 - 20)  SpO2: 97% (19 Jan 2023 10:00) (95% - 100%)    Parameters below as of 19 Jan 2023 10:00  Patient On (Oxygen Delivery Method): nasal cannula  O2 Flow (L/min): 2    I&O's Summary        MEDICATIONS  (STANDING):  chlorhexidine 4% Liquid 1 Application(s) Topical <User Schedule>  heparin  Infusion.  Unit(s)/Hr (11 mL/Hr) IV Continuous <Continuous>  levothyroxine 100 MICROGram(s) Oral daily  sodium zirconium cyclosilicate 5 Gram(s) Oral once    MEDICATIONS  (PRN):  heparin   Injectable 4500 Unit(s) IV Push every 6 hours PRN For aPTT less than 40  heparin   Injectable 2000 Unit(s) IV Push every 6 hours PRN For aPTT between 40 - 57        LABS                                            10.0                  Neurophils% (auto):   75.0   (01-19 @ 06:42):    7.98 )-----------(427          Lymphocytes% (auto):  10.0                                          31.8                   Eosinphils% (auto):   0.5      Manual%: Neutrophils x    ; Lymphocytes x    ; Eosinophils x    ; Bands%: x    ; Blasts x                                    134    |  98     |  15                  Calcium: 8.4   / iCa: x      (01-19 @ 06:42)    ----------------------------<  109       Magnesium: x                                5.2     |  23     |  0.6              Phosphorous: x        TPro  5.7    /  Alb  2.8    /  TBili  0.3    /  DBili  x      /  AST  36     /  ALT  48     /  AlkPhos  125    19 Jan 2023 06:42    ( 01-19 @ 04:50 )   PT: x    ;   INR: x      aPTT: 108.0 sec
IMPRESSION:  Acute bilateral submassive PE  Possible pulmonary infarct  B/l Pleural effusions  normocytic anemia  hyponatremia   hyperkalemia     PLAN:    CNS: Avoid CNS depressants     HEENT: Oral care    PULMONARY:  HOB @ 45 degrees.  Aspiration precautions. supplemental oxygen target SPO2 >92    CARDIOVASCULAR: Echo. trend trops. Volume contraction as tolerated    GI: GI prophylaxis.  Feeding.  Bowel regimen. no obvious signs of bleed    RENAL:  Follow up lytes.  Correct as needed    INFECTIOUS DISEASE: Follow up cultures    HEMATOLOGICAL:  continue with heparin ggt. Monitor PTT adjust per normogram. duplex. Monitor h/h.  Transfuse PRBC target Hb > 7    ENDOCRINE:  Follow up FS.  Insulin protocol if needed.    MUSCULOSKELETAL: bedrest    Patient is critically ill and required ICU admission for monitoring his hemodynamic status
no

## 2023-01-20 LAB
ANION GAP SERPL CALC-SCNC: 13 MMOL/L — SIGNIFICANT CHANGE UP (ref 7–14)
BUN SERPL-MCNC: 16 MG/DL — SIGNIFICANT CHANGE UP (ref 10–20)
CALCIUM SERPL-MCNC: 8.1 MG/DL — LOW (ref 8.4–10.5)
CHLORIDE SERPL-SCNC: 92 MMOL/L — LOW (ref 98–110)
CO2 SERPL-SCNC: 22 MMOL/L — SIGNIFICANT CHANGE UP (ref 17–32)
CREAT SERPL-MCNC: 0.5 MG/DL — LOW (ref 0.7–1.5)
EGFR: 85 ML/MIN/1.73M2 — SIGNIFICANT CHANGE UP
GLUCOSE SERPL-MCNC: 95 MG/DL — SIGNIFICANT CHANGE UP (ref 70–99)
HCT VFR BLD CALC: 30.4 % — LOW (ref 37–47)
HGB BLD-MCNC: 9.8 G/DL — LOW (ref 12–16)
MCHC RBC-ENTMCNC: 27.5 PG — SIGNIFICANT CHANGE UP (ref 27–31)
MCHC RBC-ENTMCNC: 32.2 G/DL — SIGNIFICANT CHANGE UP (ref 32–37)
MCV RBC AUTO: 85.4 FL — SIGNIFICANT CHANGE UP (ref 81–99)
NRBC # BLD: 0 /100 WBCS — SIGNIFICANT CHANGE UP (ref 0–0)
PLATELET # BLD AUTO: 414 K/UL — HIGH (ref 130–400)
POTASSIUM SERPL-MCNC: 4.5 MMOL/L — SIGNIFICANT CHANGE UP (ref 3.5–5)
POTASSIUM SERPL-SCNC: 4.5 MMOL/L — SIGNIFICANT CHANGE UP (ref 3.5–5)
RBC # BLD: 3.56 M/UL — LOW (ref 4.2–5.4)
RBC # FLD: 14 % — SIGNIFICANT CHANGE UP (ref 11.5–14.5)
SODIUM SERPL-SCNC: 127 MMOL/L — LOW (ref 135–146)
WBC # BLD: 7.27 K/UL — SIGNIFICANT CHANGE UP (ref 4.8–10.8)
WBC # FLD AUTO: 7.27 K/UL — SIGNIFICANT CHANGE UP (ref 4.8–10.8)

## 2023-01-20 PROCEDURE — 99233 SBSQ HOSP IP/OBS HIGH 50: CPT

## 2023-01-20 PROCEDURE — 76604 US EXAM CHEST: CPT | Mod: 26

## 2023-01-20 PROCEDURE — 93306 TTE W/DOPPLER COMPLETE: CPT | Mod: 26

## 2023-01-20 RX ORDER — INFLUENZA VIRUS VACCINE 15; 15; 15; 15 UG/.5ML; UG/.5ML; UG/.5ML; UG/.5ML
0.7 SUSPENSION INTRAMUSCULAR ONCE
Refills: 0 | Status: DISCONTINUED | OUTPATIENT
Start: 2023-01-20 | End: 2023-01-26

## 2023-01-20 RX ADMIN — CHLORHEXIDINE GLUCONATE 1 APPLICATION(S): 213 SOLUTION TOPICAL at 05:37

## 2023-01-20 RX ADMIN — ENOXAPARIN SODIUM 60 MILLIGRAM(S): 100 INJECTION SUBCUTANEOUS at 21:37

## 2023-01-20 RX ADMIN — Medication 100 MICROGRAM(S): at 05:14

## 2023-01-20 RX ADMIN — SODIUM ZIRCONIUM CYCLOSILICATE 5 GRAM(S): 10 POWDER, FOR SUSPENSION ORAL at 06:51

## 2023-01-20 RX ADMIN — ENOXAPARIN SODIUM 60 MILLIGRAM(S): 100 INJECTION SUBCUTANEOUS at 05:14

## 2023-01-20 NOTE — PROGRESS NOTE ADULT - ASSESSMENT
89F PMHx colon ca s/p colostomy, thyroid ca s/p resection here with acute hypoxic resp failure, found to have acute PE.    #Acute hypoxic resp failure, due to acute PE  with pleuritic cp, resolved  cta with bl segmental PE, RV strain  tte  therapeutic lovenox  c/b elevated cardiac enzymes  ekg noted  no intervention per ir  nc to keep spo2 >92  #Transaminitis hepatocellular predom  mild, trend  possible congestive hepatopathy  #Colon ca  outpt f/u  #Thyroid ca  synthroid 100  #DVT ppx  lovenox    #Progress Note Handoff:  Pending (specify):  Consults_________, Tests________, Test Results_______, Other____tte_____  Family discussion: d/w pt at bedside re: treatment plan, primary dx  Disposition: Home___/SNF___/Other________/Unknown at this time____x____

## 2023-01-20 NOTE — PATIENT PROFILE ADULT - FALL HARM RISK - HARM RISK INTERVENTIONS

## 2023-01-20 NOTE — PROGRESS NOTE ADULT - SUBJECTIVE AND OBJECTIVE BOX
INTERVAL HPI/OVERNIGHT EVENTS:    SUBJECTIVE: Patient seen and examined at bedside.     cc: sob  no cp, abd pain, fever  sob improving, no cough, orthopnea, pnd    OBJECTIVE:    VITAL SIGNS:  Vital Signs Last 24 Hrs  T(C): 36.4 (19 Jan 2023 16:00), Max: 36.4 (19 Jan 2023 16:00)  T(F): 97.5 (19 Jan 2023 16:00), Max: 97.5 (19 Jan 2023 16:00)  HR: 80 (20 Jan 2023 10:00) (74 - 122)  BP: 101/55 (20 Jan 2023 10:00) (89/54 - 119/74)  BP(mean): 74 (20 Jan 2023 10:00) (69 - 91)  RR: 16 (20 Jan 2023 10:00) (12 - 18)  SpO2: 98% (20 Jan 2023 10:00) (92% - 99%)    Parameters below as of 20 Jan 2023 10:00  Patient On (Oxygen Delivery Method): nasal cannula  O2 Flow (L/min): 3        PHYSICAL EXAM:    General: NAD  HEENT: NC/AT; PERRL, clear conjunctiva  Neck: supple  Respiratory: CTA b/l  Cardiovascular: +S1/S2; RRR  Abdomen: soft, NT/ND; +BS x4  Extremities: WWP, 2+ peripheral pulses b/l; no LE edema  Skin: normal color and turgor; no rash  Neurological:    MEDICATIONS:  MEDICATIONS  (STANDING):  chlorhexidine 4% Liquid 1 Application(s) Topical <User Schedule>  enoxaparin Injectable 60 milliGRAM(s) SubCutaneous every 12 hours  levothyroxine 100 MICROGram(s) Oral daily    MEDICATIONS  (PRN):      ALLERGIES:  Allergies    No Known Allergies    Intolerances        LABS:                        9.8    7.27  )-----------( 414      ( 20 Jan 2023 06:31 )             30.4     Hemoglobin: 9.8 g/dL (01-20 @ 06:31)  Hemoglobin: 10.0 g/dL (01-19 @ 06:42)  Hemoglobin: 10.0 g/dL (01-19 @ 01:17)  Hemoglobin: 9.7 g/dL (01-18 @ 16:58)    CBC Full  -  ( 20 Jan 2023 06:31 )  WBC Count : 7.27 K/uL  RBC Count : 3.56 M/uL  Hemoglobin : 9.8 g/dL  Hematocrit : 30.4 %  Platelet Count - Automated : 414 K/uL  Mean Cell Volume : 85.4 fL  Mean Cell Hemoglobin : 27.5 pg  Mean Cell Hemoglobin Concentration : 32.2 g/dL  Auto Neutrophil # : x  Auto Lymphocyte # : x  Auto Monocyte # : x  Auto Eosinophil # : x  Auto Basophil # : x  Auto Neutrophil % : x  Auto Lymphocyte % : x  Auto Monocyte % : x  Auto Eosinophil % : x  Auto Basophil % : x    01-19    134<L>  |  98  |  15  ----------------------------<  109<H>  5.2<H>   |  23  |  0.6<L>    Ca    8.4      19 Jan 2023 06:42    TPro  5.7<L>  /  Alb  2.8<L>  /  TBili  0.3  /  DBili  x   /  AST  36  /  ALT  48<H>  /  AlkPhos  125<H>  01-19    Creatinine Trend: 0.6<--, 0.7<--, 0.7<--  LIVER FUNCTIONS - ( 19 Jan 2023 06:42 )  Alb: 2.8 g/dL / Pro: 5.7 g/dL / ALK PHOS: 125 U/L / ALT: 48 U/L / AST: 36 U/L / GGT: x           PT/INR - ( 18 Jan 2023 20:52 )   PT: 16.60 sec;   INR: 1.44 ratio         PTT - ( 19 Jan 2023 12:51 )  PTT:93.6 sec    hs Troponin:              CSF:                      EKG:   MICROBIOLOGY:    Culture - Blood (collected 18 Jan 2023 16:58)  Source: .Blood Blood  Preliminary Report (19 Jan 2023 23:02):    No growth to date.    Culture - Blood (collected 18 Jan 2023 16:58)  Source: .Blood Blood  Preliminary Report (19 Jan 2023 23:02):    No growth to date.      IMAGING:      Labs, imaging, EKG personally reviewed    RADIOLOGY & ADDITIONAL TESTS: Reviewed.

## 2023-01-20 NOTE — PROGRESS NOTE ADULT - ASSESSMENT
IMPRESSION:    Bilateral PE unprovoked on NC  HO colon Ca and Thyroid Ca  Bilateral pleural effusions   Small pericardial effusion     PLAN:    CNS:  No depressants     HEENT: Oral care    PULMONARY:  HOB @ 45 degrees.  Aspiration precautions.  Wean O2 as tolerated.  L chest US    CARDIOVASCULAR:  Echo.  Avoid overload.      GI: GI prophylaxis.  Feeding.  Bowel regimen     RENAL:  Follow up lytes.  Correct as needed    INFECTIOUS DISEASE: PROCA    HEMATOLOGICAL:  DVT therapy.  LE duplex - LMWH for now     ENDOCRINE:  Follow up FS.  Insulin protocol if needed.  SDU

## 2023-01-21 LAB
ALBUMIN SERPL ELPH-MCNC: 2.4 G/DL — LOW (ref 3.5–5.2)
ALP SERPL-CCNC: 104 U/L — SIGNIFICANT CHANGE UP (ref 30–115)
ALT FLD-CCNC: 39 U/L — SIGNIFICANT CHANGE UP (ref 0–41)
ANION GAP SERPL CALC-SCNC: 12 MMOL/L — SIGNIFICANT CHANGE UP (ref 7–14)
AST SERPL-CCNC: 50 U/L — HIGH (ref 0–41)
BASOPHILS # BLD AUTO: 0.01 K/UL — SIGNIFICANT CHANGE UP (ref 0–0.2)
BASOPHILS NFR BLD AUTO: 0.2 % — SIGNIFICANT CHANGE UP (ref 0–1)
BILIRUB SERPL-MCNC: 0.3 MG/DL — SIGNIFICANT CHANGE UP (ref 0.2–1.2)
BUN SERPL-MCNC: 13 MG/DL — SIGNIFICANT CHANGE UP (ref 10–20)
CALCIUM SERPL-MCNC: 8 MG/DL — LOW (ref 8.4–10.5)
CHLORIDE SERPL-SCNC: 96 MMOL/L — LOW (ref 98–110)
CO2 SERPL-SCNC: 23 MMOL/L — SIGNIFICANT CHANGE UP (ref 17–32)
CREAT SERPL-MCNC: 0.6 MG/DL — LOW (ref 0.7–1.5)
EGFR: 81 ML/MIN/1.73M2 — SIGNIFICANT CHANGE UP
EOSINOPHIL # BLD AUTO: 0.18 K/UL — SIGNIFICANT CHANGE UP (ref 0–0.7)
EOSINOPHIL NFR BLD AUTO: 2.9 % — SIGNIFICANT CHANGE UP (ref 0–8)
GLUCOSE SERPL-MCNC: 90 MG/DL — SIGNIFICANT CHANGE UP (ref 70–99)
HCT VFR BLD CALC: 31.4 % — LOW (ref 37–47)
HGB BLD-MCNC: 10.2 G/DL — LOW (ref 12–16)
IMM GRANULOCYTES NFR BLD AUTO: 0.5 % — HIGH (ref 0.1–0.3)
LYMPHOCYTES # BLD AUTO: 1.02 K/UL — LOW (ref 1.2–3.4)
LYMPHOCYTES # BLD AUTO: 16.4 % — LOW (ref 20.5–51.1)
MAGNESIUM SERPL-MCNC: 1.9 MG/DL — SIGNIFICANT CHANGE UP (ref 1.8–2.4)
MCHC RBC-ENTMCNC: 27.7 PG — SIGNIFICANT CHANGE UP (ref 27–31)
MCHC RBC-ENTMCNC: 32.5 G/DL — SIGNIFICANT CHANGE UP (ref 32–37)
MCV RBC AUTO: 85.3 FL — SIGNIFICANT CHANGE UP (ref 81–99)
MONOCYTES # BLD AUTO: 0.95 K/UL — HIGH (ref 0.1–0.6)
MONOCYTES NFR BLD AUTO: 15.3 % — HIGH (ref 1.7–9.3)
NEUTROPHILS # BLD AUTO: 4.02 K/UL — SIGNIFICANT CHANGE UP (ref 1.4–6.5)
NEUTROPHILS NFR BLD AUTO: 64.7 % — SIGNIFICANT CHANGE UP (ref 42.2–75.2)
NRBC # BLD: 0 /100 WBCS — SIGNIFICANT CHANGE UP (ref 0–0)
PLATELET # BLD AUTO: 411 K/UL — HIGH (ref 130–400)
POTASSIUM SERPL-MCNC: 4.7 MMOL/L — SIGNIFICANT CHANGE UP (ref 3.5–5)
POTASSIUM SERPL-SCNC: 4.7 MMOL/L — SIGNIFICANT CHANGE UP (ref 3.5–5)
PROT SERPL-MCNC: 5.1 G/DL — LOW (ref 6–8)
RBC # BLD: 3.68 M/UL — LOW (ref 4.2–5.4)
RBC # FLD: 13.9 % — SIGNIFICANT CHANGE UP (ref 11.5–14.5)
SODIUM SERPL-SCNC: 131 MMOL/L — LOW (ref 135–146)
TSH SERPL-MCNC: 3.3 UIU/ML — SIGNIFICANT CHANGE UP (ref 0.27–4.2)
WBC # BLD: 6.21 K/UL — SIGNIFICANT CHANGE UP (ref 4.8–10.8)
WBC # FLD AUTO: 6.21 K/UL — SIGNIFICANT CHANGE UP (ref 4.8–10.8)

## 2023-01-21 PROCEDURE — 99233 SBSQ HOSP IP/OBS HIGH 50: CPT

## 2023-01-21 RX ORDER — ENOXAPARIN SODIUM 100 MG/ML
60 INJECTION SUBCUTANEOUS EVERY 12 HOURS
Refills: 0 | Status: DISCONTINUED | OUTPATIENT
Start: 2023-01-21 | End: 2023-01-23

## 2023-01-21 RX ADMIN — Medication 100 MICROGRAM(S): at 05:33

## 2023-01-21 RX ADMIN — ENOXAPARIN SODIUM 60 MILLIGRAM(S): 100 INJECTION SUBCUTANEOUS at 17:12

## 2023-01-21 RX ADMIN — ENOXAPARIN SODIUM 60 MILLIGRAM(S): 100 INJECTION SUBCUTANEOUS at 05:33

## 2023-01-21 NOTE — PROGRESS NOTE ADULT - ASSESSMENT
89F PMHx colon ca s/p colostomy, thyroid ca s/p resection here with acute hypoxic resp failure, found to have acute PE.    #Acute hypoxic resp failure, due to acute PE / pleural effusion   with pleuritic cp, resolved  cta with bl segmental PE, RV strain  tte > shows no RV strain.   therapeutic lovenox  c/b elevated cardiac enzymes  ekg noted  no intervention per ir  nc to keep spo2 >92    #Transaminitis hepatocellular predom  mild, trend    #Colon ca  outpt f/u  #Thyroid ca  synthroid 100  #DVT ppx  lovenox    #Progress Note Handoff:  Pending (specify):  AHRF / ? Thora /PT  Family discussion: d/w pt at bedside re: treatment plan, primary dx  Disposition: Home vs STR

## 2023-01-21 NOTE — PROGRESS NOTE ADULT - SUBJECTIVE AND OBJECTIVE BOX
SUNNY LOMBARDI  89y  Female      Patient is a 89y old  Female who presents with a chief complaint of PE.       INTERVAL HPI/OVERNIGHT EVENTS:      ******************************* REVIEW OF SYSTEMS:**********************************************    All other review of systems negative    *********************** VITALS ******************************************    T(F): 96.6 (01-21-23 @ 05:36)  HR: 73 (01-21-23 @ 05:36) (73 - 81)  BP: 132/61 (01-21-23 @ 05:36) (117/59 - 132/61)  RR: 18 (01-21-23 @ 05:36) (17 - 19)  SpO2: 98% (01-20-23 @ 21:48) (94% - 98%)            ******************************** PHYSICAL EXAM:**************************************************  GENERAL: NAD    PSYCH: no agitation, baseline mentation  HEENT:     NERVOUS SYSTEM:  Alert & Oriented X3,     PULMONARY: KEVIN, CTA    CARDIOVASCULAR: S1S2 RRR    GI: Soft, NT, ND; BS present.    EXTREMITIES:  2+ Peripheral Pulses, No clubbing, cyanosis, or edema    LYMPH: No lymphadenopathy noted    SKIN: No rashes or lesions      **************************** LABS *******************************************************                          10.2   6.21  )-----------( 411      ( 21 Jan 2023 04:49 )             31.4     01-21    131<L>  |  96<L>  |  13  ----------------------------<  90  4.7   |  23  |  0.6<L>    Ca    8.0<L>      21 Jan 2023 04:49  Mg     1.9     01-21    TPro  5.1<L>  /  Alb  2.4<L>  /  TBili  0.3  /  DBili  x   /  AST  50<H>  /  ALT  39  /  AlkPhos  104  01-21        PTT - ( 19 Jan 2023 12:51 )  PTT:93.6 sec  Lactate Trend  01-18 @ 16:58 Lactate:1.7         CAPILLARY BLOOD GLUCOSE              **************************Active Medications *******************************************  No Known Allergies      chlorhexidine 4% Liquid 1 Application(s) Topical <User Schedule>  enoxaparin Injectable 60 milliGRAM(s) SubCutaneous every 12 hours  influenza  Vaccine (HIGH DOSE) 0.7 milliLiter(s) IntraMuscular once  levothyroxine 100 MICROGram(s) Oral daily      ***************************************************  RADIOLOGY & ADDITIONAL TESTS:    Imaging Personally Reviewed:  [ ] YES  [ ] NO    HEALTH ISSUES - PROBLEM Dx:  Pulmonary thromboembolism

## 2023-01-22 LAB
ALBUMIN SERPL ELPH-MCNC: 2.5 G/DL — LOW (ref 3.5–5.2)
ALP SERPL-CCNC: 102 U/L — SIGNIFICANT CHANGE UP (ref 30–115)
ALT FLD-CCNC: 46 U/L — HIGH (ref 0–41)
ANION GAP SERPL CALC-SCNC: 12 MMOL/L — SIGNIFICANT CHANGE UP (ref 7–14)
AST SERPL-CCNC: 57 U/L — HIGH (ref 0–41)
BASOPHILS # BLD AUTO: 0.03 K/UL — SIGNIFICANT CHANGE UP (ref 0–0.2)
BASOPHILS NFR BLD AUTO: 0.5 % — SIGNIFICANT CHANGE UP (ref 0–1)
BILIRUB SERPL-MCNC: 0.2 MG/DL — SIGNIFICANT CHANGE UP (ref 0.2–1.2)
BUN SERPL-MCNC: 10 MG/DL — SIGNIFICANT CHANGE UP (ref 10–20)
CALCIUM SERPL-MCNC: 8.1 MG/DL — LOW (ref 8.4–10.5)
CHLORIDE SERPL-SCNC: 101 MMOL/L — SIGNIFICANT CHANGE UP (ref 98–110)
CO2 SERPL-SCNC: 23 MMOL/L — SIGNIFICANT CHANGE UP (ref 17–32)
CREAT SERPL-MCNC: 0.5 MG/DL — LOW (ref 0.7–1.5)
EGFR: 85 ML/MIN/1.73M2 — SIGNIFICANT CHANGE UP
EOSINOPHIL # BLD AUTO: 0.2 K/UL — SIGNIFICANT CHANGE UP (ref 0–0.7)
EOSINOPHIL NFR BLD AUTO: 3.5 % — SIGNIFICANT CHANGE UP (ref 0–8)
GLUCOSE SERPL-MCNC: 91 MG/DL — SIGNIFICANT CHANGE UP (ref 70–99)
HCT VFR BLD CALC: 32.6 % — LOW (ref 37–47)
HGB BLD-MCNC: 10.7 G/DL — LOW (ref 12–16)
IMM GRANULOCYTES NFR BLD AUTO: 0.5 % — HIGH (ref 0.1–0.3)
LYMPHOCYTES # BLD AUTO: 0.93 K/UL — LOW (ref 1.2–3.4)
LYMPHOCYTES # BLD AUTO: 16.1 % — LOW (ref 20.5–51.1)
MAGNESIUM SERPL-MCNC: 1.9 MG/DL — SIGNIFICANT CHANGE UP (ref 1.8–2.4)
MCHC RBC-ENTMCNC: 27.9 PG — SIGNIFICANT CHANGE UP (ref 27–31)
MCHC RBC-ENTMCNC: 32.8 G/DL — SIGNIFICANT CHANGE UP (ref 32–37)
MCV RBC AUTO: 84.9 FL — SIGNIFICANT CHANGE UP (ref 81–99)
MONOCYTES # BLD AUTO: 0.94 K/UL — HIGH (ref 0.1–0.6)
MONOCYTES NFR BLD AUTO: 16.3 % — HIGH (ref 1.7–9.3)
NEUTROPHILS # BLD AUTO: 3.64 K/UL — SIGNIFICANT CHANGE UP (ref 1.4–6.5)
NEUTROPHILS NFR BLD AUTO: 63.1 % — SIGNIFICANT CHANGE UP (ref 42.2–75.2)
NRBC # BLD: 0 /100 WBCS — SIGNIFICANT CHANGE UP (ref 0–0)
PLATELET # BLD AUTO: 385 K/UL — SIGNIFICANT CHANGE UP (ref 130–400)
POTASSIUM SERPL-MCNC: 4.6 MMOL/L — SIGNIFICANT CHANGE UP (ref 3.5–5)
POTASSIUM SERPL-SCNC: 4.6 MMOL/L — SIGNIFICANT CHANGE UP (ref 3.5–5)
PROT SERPL-MCNC: 5.2 G/DL — LOW (ref 6–8)
RBC # BLD: 3.84 M/UL — LOW (ref 4.2–5.4)
RBC # FLD: 14 % — SIGNIFICANT CHANGE UP (ref 11.5–14.5)
SODIUM SERPL-SCNC: 136 MMOL/L — SIGNIFICANT CHANGE UP (ref 135–146)
WBC # BLD: 5.77 K/UL — SIGNIFICANT CHANGE UP (ref 4.8–10.8)
WBC # FLD AUTO: 5.77 K/UL — SIGNIFICANT CHANGE UP (ref 4.8–10.8)

## 2023-01-22 PROCEDURE — 99233 SBSQ HOSP IP/OBS HIGH 50: CPT

## 2023-01-22 RX ORDER — FUROSEMIDE 40 MG
40 TABLET ORAL ONCE
Refills: 0 | Status: COMPLETED | OUTPATIENT
Start: 2023-01-22 | End: 2023-01-22

## 2023-01-22 RX ADMIN — ENOXAPARIN SODIUM 60 MILLIGRAM(S): 100 INJECTION SUBCUTANEOUS at 05:40

## 2023-01-22 RX ADMIN — Medication 40 MILLIGRAM(S): at 14:15

## 2023-01-22 RX ADMIN — Medication 100 MICROGRAM(S): at 05:41

## 2023-01-22 RX ADMIN — ENOXAPARIN SODIUM 60 MILLIGRAM(S): 100 INJECTION SUBCUTANEOUS at 18:15

## 2023-01-22 NOTE — PROGRESS NOTE ADULT - SUBJECTIVE AND OBJECTIVE BOX
SUNNY LOMBARDI  89y  Female      Patient is a 89y old  Female who presents with a chief complaint of PE.       INTERVAL HPI/OVERNIGHT EVENTS:      ******************************* REVIEW OF SYSTEMS:**********************************************    All other review of systems negative    *********************** VITALS ******************************************    T(F): 96.5 (01-22-23 @ 05:38)  HR: 72 (01-22-23 @ 05:38) (72 - 98)  BP: 121/61 (01-22-23 @ 05:38) (121/60 - 144/67)  RR: 18 (01-22-23 @ 05:38) (18 - 18)  SpO2: 94% (01-21-23 @ 20:00) (94% - 94%)            ******************************** PHYSICAL EXAM:**************************************************  GENERAL: NAD    PSYCH: no agitation, baseline mentation  HEENT:     NERVOUS SYSTEM:  Alert & Oriented X3,   PULMONARY: KEVIN, CTA    CARDIOVASCULAR: S1S2 RRR    GI: Soft, NT, ND; BS present.    EXTREMITIES:  2+ Peripheral Pulses, No clubbing, cyanosis, or edema    LYMPH: No lymphadenopathy noted    SKIN: No rashes or lesions      **************************** LABS *******************************************************                          10.7   5.77  )-----------( 385      ( 22 Jan 2023 08:09 )             32.6     01-21    131<L>  |  96<L>  |  13  ----------------------------<  90  4.7   |  23  |  0.6<L>    Ca    8.0<L>      21 Jan 2023 04:49  Mg     1.9     01-21    TPro  5.1<L>  /  Alb  2.4<L>  /  TBili  0.3  /  DBili  x   /  AST  50<H>  /  ALT  39  /  AlkPhos  104  01-21          Lactate Trend  01-18 @ 16:58 Lactate:1.7         CAPILLARY BLOOD GLUCOSE              **************************Active Medications *******************************************  No Known Allergies      chlorhexidine 4% Liquid 1 Application(s) Topical <User Schedule>  enoxaparin Injectable 60 milliGRAM(s) SubCutaneous every 12 hours  influenza  Vaccine (HIGH DOSE) 0.7 milliLiter(s) IntraMuscular once  levothyroxine 100 MICROGram(s) Oral daily      ***************************************************  RADIOLOGY & ADDITIONAL TESTS:    Imaging Personally Reviewed:  [ ] YES  [ ] NO    HEALTH ISSUES - PROBLEM Dx:  Pulmonary thromboembolism

## 2023-01-22 NOTE — PROGRESS NOTE ADULT - ASSESSMENT
89F PMHx colon ca s/p colostomy, thyroid ca s/p resection here with acute hypoxic resp failure, found to have acute PE.    #Acute hypoxic resp failure, due to acute PE / pleural effusion   with pleuritic cp, resolved  Will give Lasix 40mg x1 today. repeat CXR tomorrow.   cta with bl segmental PE, RV strain  tte > shows no RV strain.   therapeutic lovenox  c/b elevated cardiac enzymes  ekg noted  no intervention per ir  nc to keep spo2 >92, titrate off O2 as tolerated     #Transaminitis hepatocellular predom  mild, trend    #Colon ca  outpt f/u  #Thyroid ca  synthroid 100  #DVT ppx  lovenox    #Progress Note Handoff:  Pending (specify):  AHRF / ? Thora /PT  Family discussion: d/w pt at bedside re: treatment plan, primary dx  Disposition: Home vs STR

## 2023-01-22 NOTE — PROGRESS NOTE ADULT - SUBJECTIVE AND OBJECTIVE BOX
SUNNY LOMBARDI 89y Female  MRN#: [id number]   Hospital Day: 4d    HPI:  89 year old female history of colon CA in remission status post colostomy, thyroid cancer s/p resection, hypothyroidism presents for shortness of breath. For about a week has had upper respiratory symptoms in addition to mild shortness of breath. About a month ago went to PCP who started on course of oral abx for suspected pneumonia, which she completed. yesterday she went to PCP again and ordered CXR and CT scan. Today felt more short of breath so came to the ED. Was found to be satting in the lower 90's in the ED. Placed on 2L NC with improvement. CXR showed cardiomegaly and retro cardiac opacification. CT PE revealed PE with possible right heart strain. IR was consulted and recommend no intervention at this time. Dutton to continue with heparin ggt. Denies chest pain, abdominal pain, palpitations, nausea vomitting, numbness, tingling, headache, fever chills.    In the ED vitals T 99 , /80, SPO2 100% on 2L NC.  EKG sinus tachycardia   Labs show trops 0.13, BNP 1977, Hb 9.7 Na 128, K 5.6   CT PE: Acute pulmonary emboli throughout bilateral lobar and segmental pulmonary arteries (near complete occlusion of left lower lobe pulmonary arteries). RV:LV ratio greater than 1 with reflux of contrast to IVC, suspicious for right heart strain. Small bilateral pleural effusions with overlying compressive atelectasis. Left lower lobe opacification, which may represent combination of atelectasis and infarction, in the appropriate clinical   setting. Small pericardial effusion, measuring up to 1.4 cm in thickness.   (18 Jan 2023 20:51)      SUBJECTIVE      OBJECTIVE  PAST MEDICAL & SURGICAL HISTORY  Hypothyroidism    History of colon cancer    Colostomy present      ALLERGIES:  No Known Allergies    MEDICATIONS:  STANDING MEDICATIONS  chlorhexidine 4% Liquid 1 Application(s) Topical <User Schedule>  enoxaparin Injectable 60 milliGRAM(s) SubCutaneous every 12 hours  influenza  Vaccine (HIGH DOSE) 0.7 milliLiter(s) IntraMuscular once  levothyroxine 100 MICROGram(s) Oral daily    PRN MEDICATIONS      VITAL SIGNS: Last 24 Hours  T(C): 35.8 (22 Jan 2023 05:38), Max: 35.8 (22 Jan 2023 05:38)  T(F): 96.5 (22 Jan 2023 05:38), Max: 96.5 (22 Jan 2023 05:38)  HR: 72 (22 Jan 2023 05:38) (72 - 98)  BP: 121/61 (22 Jan 2023 05:38) (121/60 - 144/67)  BP(mean): --  RR: 18 (22 Jan 2023 05:38) (18 - 18)  SpO2: 94% (21 Jan 2023 20:00) (94% - 94%)    LABS:                        10.2   6.21  )-----------( 411      ( 21 Jan 2023 04:49 )             31.4     01-21    131<L>  |  96<L>  |  13  ----------------------------<  90  4.7   |  23  |  0.6<L>    Ca    8.0<L>      21 Jan 2023 04:49  Mg     1.9     01-21    TPro  5.1<L>  /  Alb  2.4<L>  /  TBili  0.3  /  DBili  x   /  AST  50<H>  /  ALT  39  /  AlkPhos  104  01-21                      PHYSICAL EXAM:  GENERAL: NAD    PSYCH: no agitation, baseline mentation  HEENT:     NERVOUS SYSTEM:  Alert & Oriented X3,     PULMONARY: KEVIN, CTA    CARDIOVASCULAR: S1S2 RRR    GI: Soft, NT, ND; BS present.    EXTREMITIES:  2+ Peripheral Pulses, No clubbing, cyanosis, or edema    LYMPH: No lymphadenopathy noted    SKIN: No rashes or lesions      ASSESSMENT AND PLAN:    89F PMHx colon ca s/p colostomy, thyroid ca s/p resection here with acute hypoxic resp failure, found to have acute PE.    #Acute hypoxic resp failure, due to acute PE / pleural effusion   with pleuritic cp, resolved  cta with bl segmental PE, RV strain  tte > shows no RV strain.   therapeutic lovenox  c/b elevated cardiac enzymes  ekg noted  no intervention per ir  nc to keep spo2 >92    #Transaminitis hepatocellular predom  mild, trend    #Colon ca  outpt f/u    #Thyroid ca  synthroid 100    #DVT ppx: lovenox

## 2023-01-23 LAB
ALBUMIN SERPL ELPH-MCNC: 2.7 G/DL — LOW (ref 3.5–5.2)
ALP SERPL-CCNC: 101 U/L — SIGNIFICANT CHANGE UP (ref 30–115)
ALT FLD-CCNC: 48 U/L — HIGH (ref 0–41)
ANION GAP SERPL CALC-SCNC: 13 MMOL/L — SIGNIFICANT CHANGE UP (ref 7–14)
AST SERPL-CCNC: 53 U/L — HIGH (ref 0–41)
BASOPHILS # BLD AUTO: 0.02 K/UL — SIGNIFICANT CHANGE UP (ref 0–0.2)
BASOPHILS NFR BLD AUTO: 0.3 % — SIGNIFICANT CHANGE UP (ref 0–1)
BILIRUB SERPL-MCNC: 0.3 MG/DL — SIGNIFICANT CHANGE UP (ref 0.2–1.2)
BUN SERPL-MCNC: 10 MG/DL — SIGNIFICANT CHANGE UP (ref 10–20)
CALCIUM SERPL-MCNC: 8.4 MG/DL — SIGNIFICANT CHANGE UP (ref 8.4–10.5)
CHLORIDE SERPL-SCNC: 96 MMOL/L — LOW (ref 98–110)
CO2 SERPL-SCNC: 26 MMOL/L — SIGNIFICANT CHANGE UP (ref 17–32)
CREAT SERPL-MCNC: 0.6 MG/DL — LOW (ref 0.7–1.5)
CULTURE RESULTS: SIGNIFICANT CHANGE UP
CULTURE RESULTS: SIGNIFICANT CHANGE UP
EGFR: 81 ML/MIN/1.73M2 — SIGNIFICANT CHANGE UP
EOSINOPHIL # BLD AUTO: 0.28 K/UL — SIGNIFICANT CHANGE UP (ref 0–0.7)
EOSINOPHIL NFR BLD AUTO: 4 % — SIGNIFICANT CHANGE UP (ref 0–8)
GLUCOSE SERPL-MCNC: 93 MG/DL — SIGNIFICANT CHANGE UP (ref 70–99)
HCT VFR BLD CALC: 35.5 % — LOW (ref 37–47)
HGB BLD-MCNC: 11.5 G/DL — LOW (ref 12–16)
IMM GRANULOCYTES NFR BLD AUTO: 0.7 % — HIGH (ref 0.1–0.3)
LYMPHOCYTES # BLD AUTO: 1.11 K/UL — LOW (ref 1.2–3.4)
LYMPHOCYTES # BLD AUTO: 15.7 % — LOW (ref 20.5–51.1)
MAGNESIUM SERPL-MCNC: 1.7 MG/DL — LOW (ref 1.8–2.4)
MCHC RBC-ENTMCNC: 27.8 PG — SIGNIFICANT CHANGE UP (ref 27–31)
MCHC RBC-ENTMCNC: 32.4 G/DL — SIGNIFICANT CHANGE UP (ref 32–37)
MCV RBC AUTO: 86 FL — SIGNIFICANT CHANGE UP (ref 81–99)
MONOCYTES # BLD AUTO: 1.12 K/UL — HIGH (ref 0.1–0.6)
MONOCYTES NFR BLD AUTO: 15.8 % — HIGH (ref 1.7–9.3)
NEUTROPHILS # BLD AUTO: 4.5 K/UL — SIGNIFICANT CHANGE UP (ref 1.4–6.5)
NEUTROPHILS NFR BLD AUTO: 63.5 % — SIGNIFICANT CHANGE UP (ref 42.2–75.2)
NRBC # BLD: 0 /100 WBCS — SIGNIFICANT CHANGE UP (ref 0–0)
PLATELET # BLD AUTO: 383 K/UL — SIGNIFICANT CHANGE UP (ref 130–400)
POTASSIUM SERPL-MCNC: 4.6 MMOL/L — SIGNIFICANT CHANGE UP (ref 3.5–5)
POTASSIUM SERPL-SCNC: 4.6 MMOL/L — SIGNIFICANT CHANGE UP (ref 3.5–5)
PROT SERPL-MCNC: 5.7 G/DL — LOW (ref 6–8)
RBC # BLD: 4.13 M/UL — LOW (ref 4.2–5.4)
RBC # FLD: 14 % — SIGNIFICANT CHANGE UP (ref 11.5–14.5)
SODIUM SERPL-SCNC: 135 MMOL/L — SIGNIFICANT CHANGE UP (ref 135–146)
SPECIMEN SOURCE: SIGNIFICANT CHANGE UP
SPECIMEN SOURCE: SIGNIFICANT CHANGE UP
WBC # BLD: 7.08 K/UL — SIGNIFICANT CHANGE UP (ref 4.8–10.8)
WBC # FLD AUTO: 7.08 K/UL — SIGNIFICANT CHANGE UP (ref 4.8–10.8)

## 2023-01-23 PROCEDURE — 99232 SBSQ HOSP IP/OBS MODERATE 35: CPT

## 2023-01-23 PROCEDURE — 99233 SBSQ HOSP IP/OBS HIGH 50: CPT

## 2023-01-23 PROCEDURE — 71045 X-RAY EXAM CHEST 1 VIEW: CPT | Mod: 26

## 2023-01-23 RX ORDER — PANTOPRAZOLE SODIUM 20 MG/1
40 TABLET, DELAYED RELEASE ORAL
Refills: 0 | Status: DISCONTINUED | OUTPATIENT
Start: 2023-01-23 | End: 2023-01-26

## 2023-01-23 RX ORDER — ENOXAPARIN SODIUM 100 MG/ML
60 INJECTION SUBCUTANEOUS EVERY 12 HOURS
Refills: 0 | Status: DISCONTINUED | OUTPATIENT
Start: 2023-01-24 | End: 2023-01-24

## 2023-01-23 RX ORDER — IRON SUCROSE 20 MG/ML
200 INJECTION, SOLUTION INTRAVENOUS EVERY 24 HOURS
Refills: 0 | Status: COMPLETED | OUTPATIENT
Start: 2023-01-23 | End: 2023-01-25

## 2023-01-23 RX ORDER — FUROSEMIDE 40 MG
40 TABLET ORAL ONCE
Refills: 0 | Status: COMPLETED | OUTPATIENT
Start: 2023-01-23 | End: 2023-01-23

## 2023-01-23 RX ORDER — MAGNESIUM SULFATE 500 MG/ML
2 VIAL (ML) INJECTION ONCE
Refills: 0 | Status: COMPLETED | OUTPATIENT
Start: 2023-01-23 | End: 2023-01-23

## 2023-01-23 RX ORDER — ENOXAPARIN SODIUM 100 MG/ML
60 INJECTION SUBCUTANEOUS ONCE
Refills: 0 | Status: COMPLETED | OUTPATIENT
Start: 2023-01-23 | End: 2023-01-23

## 2023-01-23 RX ADMIN — ENOXAPARIN SODIUM 60 MILLIGRAM(S): 100 INJECTION SUBCUTANEOUS at 05:14

## 2023-01-23 RX ADMIN — ENOXAPARIN SODIUM 60 MILLIGRAM(S): 100 INJECTION SUBCUTANEOUS at 17:18

## 2023-01-23 RX ADMIN — IRON SUCROSE 110 MILLIGRAM(S): 20 INJECTION, SOLUTION INTRAVENOUS at 17:18

## 2023-01-23 RX ADMIN — Medication 25 GRAM(S): at 10:56

## 2023-01-23 RX ADMIN — Medication 100 MICROGRAM(S): at 05:14

## 2023-01-23 RX ADMIN — Medication 40 MILLIGRAM(S): at 10:56

## 2023-01-23 NOTE — PROGRESS NOTE ADULT - SUBJECTIVE AND OBJECTIVE BOX
SUNNY LOMBARDI 89y Female  MRN#: [id number]   Hospital Day: 5d    HPI:  89 year old female history of colon CA in remission status post colostomy, thyroid cancer s/p resection, hypothyroidism presents for shortness of breath. For about a week has had upper respiratory symptoms in addition to mild shortness of breath. About a month ago went to PCP who started on course of oral abx for suspected pneumonia, which she completed. yesterday she went to PCP again and ordered CXR and CT scan. Today felt more short of breath so came to the ED. Was found to be satting in the lower 90's in the ED. Placed on 2L NC with improvement. CXR showed cardiomegaly and retro cardiac opacification. CT PE revealed PE with possible right heart strain. IR was consulted and recommend no intervention at this time. Dutton to continue with heparin ggt. Denies chest pain, abdominal pain, palpitations, nausea vomitting, numbness, tingling, headache, fever chills.    In the ED vitals T 99 , /80, SPO2 100% on 2L NC.  EKG sinus tachycardia   Labs show trops 0.13, BNP 1977, Hb 9.7 Na 128, K 5.6   CT PE: Acute pulmonary emboli throughout bilateral lobar and segmental pulmonary arteries (near complete occlusion of left lower lobe pulmonary arteries). RV:LV ratio greater than 1 with reflux of contrast to IVC, suspicious for right heart strain. Small bilateral pleural effusions with overlying compressive atelectasis. Left lower lobe opacification, which may represent combination of atelectasis and infarction, in the appropriate clinical   setting. Small pericardial effusion, measuring up to 1.4 cm in thickness.   (18 Jan 2023 20:51)      SUBJECTIVE  AAOx3. no overnight events or new complaints.    OBJECTIVE  PAST MEDICAL & SURGICAL HISTORY  Hypothyroidism    History of colon cancer    Colostomy present      ALLERGIES:  No Known Allergies    MEDICATIONS:  STANDING MEDICATIONS  chlorhexidine 4% Liquid 1 Application(s) Topical <User Schedule>  enoxaparin Injectable 60 milliGRAM(s) SubCutaneous once  influenza  Vaccine (HIGH DOSE) 0.7 milliLiter(s) IntraMuscular once  iron sucrose IVPB 200 milliGRAM(s) IV Intermittent every 24 hours  levothyroxine 100 MICROGram(s) Oral daily  pantoprazole    Tablet 40 milliGRAM(s) Oral before breakfast    PRN MEDICATIONS      VITAL SIGNS: Last 24 Hours  T(C): 36 (23 Jan 2023 12:20), Max: 36.3 (23 Jan 2023 05:00)  T(F): 96.8 (23 Jan 2023 12:20), Max: 97.3 (23 Jan 2023 05:00)  HR: 74 (23 Jan 2023 12:20) (74 - 88)  BP: 111/56 (23 Jan 2023 12:20) (111/56 - 134/62)  BP(mean): --  RR: 19 (23 Jan 2023 12:20) (16 - 19)  SpO2: 98% (22 Jan 2023 20:00) (98% - 98%)    LABS:                        11.5   7.08  )-----------( 383      ( 23 Jan 2023 06:45 )             35.5     01-23    135  |  96<L>  |  10  ----------------------------<  93  4.6   |  26  |  0.6<L>    Ca    8.4      23 Jan 2023 06:45  Mg     1.7     01-23    TPro  5.7<L>  /  Alb  2.7<L>  /  TBili  0.3  /  DBili  x   /  AST  53<H>  /  ALT  48<H>  /  AlkPhos  101  01-23                      PHYSICAL EXAM:  GENERAL: NAD, well-developed.  HEAD:  Atraumatic, Normocephalic.  EYES: conjunctiva and sclera clear  CHEST/LUNG: decreased air entry on LLL  HEART: regular rate and rhythm; S1/S2.  ABDOMEN: Soft, Nontender, Nondistended  EXTREMITIES: No edema.   PSYCH: AAOx3.  NEUROLOGY: non-focal; moves all extremities    ASSESSMENT AND PLAN:    #Acute hypoxic resp failure, due to acute PE / pleural effusion   with pleuritic cp, resolved  CTA 1/18 with bl segmental PE, RV strain  TTE 1/20 showed EF 45-50%, Mod AS, no RV strain  EKG stable  no intervention per IR  Currently on 2L Spo2 96%   Pulm eval noted: s/p Lasix x1 on 1/22 and 1/23. planned for tomorrow, will hold am Lovenox am dose  on Lovenox therapeutic 60 bid    #Transaminitis hepatocellular predom  mild, trend    #thyroid cancer s/p resection  synthroid 100     #Colon ca  outpt f/u    #LUBA  venofer 200 for 2 days  folate and b12 normal    #DVT ppx lovenox 60 bid  GI PPx protonix 40 daily  activity IAT. PT eval : subacute rehab  code: DNI / DNR  dispo: pending tap and discharge planning , weaning off O2

## 2023-01-23 NOTE — PHYSICAL THERAPY INITIAL EVALUATION ADULT - PERTINENT HX OF CURRENT PROBLEM, REHAB EVAL
89 year old female history of colon CA in remission status post colostomy, thyroid cancer s/p resection, hypothyroidism presents for shortness of breath. For about a week has had upper respiratory symptoms in addition to mild shortness of breath. About a month ago went to PCP who started on course of oral abx for suspected pneumonia, which she completed. yesterday she went to PCP again and ordered CXR and CT scan. Today felt more short of breath so came to the ED. Was found to be satting in the lower 90's in the ED. Placed on 2L NC with improvement. CXR showed cardiomegaly and retro cardiac opacification. CT PE revealed PE with possible right heart strain. IR was consulted and recommend no intervention at this time. Dutton to continue with heparin ggt. Denies chest pain, abdominal pain, palpitations, nausea vomitting, numbness, tingling, headache, fever chills.

## 2023-01-23 NOTE — PHYSICAL THERAPY INITIAL EVALUATION ADULT - ADDITIONAL COMMENTS
Pt had a rollator that she used for outdoors only, no asst device indoors. Pt reports she has a shower chair that she does not use.

## 2023-01-23 NOTE — PHYSICAL THERAPY INITIAL EVALUATION ADULT - GENERAL OBSERVATIONS, REHAB EVAL
13:45-15:28. Pt enountered semifowler in bed in NAD, + O2 2 lpm via NC, + primafit, + colostomy bag, son present at b/s,  no complaints, pt agreeable and eager for PT and OOB activity.

## 2023-01-23 NOTE — PROGRESS NOTE ADULT - SUBJECTIVE AND OBJECTIVE BOX
Patient is a 89y old  Female who presents with a chief complaint of PE (22 Jan 2023 10:03)        SUBJECTIVE:    On NC. Reports some SOB.    REVIEW OF SYSTEMS:    All Pertinent ROS are negative except per HPI       PHYSICAL EXAM  Vital Signs Last 24 Hrs  T(C): 36.3 (23 Jan 2023 05:00), Max: 36.3 (23 Jan 2023 05:00)  T(F): 97.3 (23 Jan 2023 05:00), Max: 97.3 (23 Jan 2023 05:00)  HR: 88 (23 Jan 2023 05:00) (81 - 88)  BP: 128/60 (23 Jan 2023 05:00) (128/60 - 142/74)  BP(mean): --  RR: 16 (23 Jan 2023 05:00) (16 - 18)  SpO2: 98% (22 Jan 2023 20:00) (98% - 98%)        CONSTITUTIONAL:  Well nourished.  NAD    ENT:   Airway patent,   No thrush    CARDIAC:   Normal rate,   regular rhythm.      RESPIRATORY:   NO Wheezing  Normal chest expansion  Not tachypneic,  No use of accessory muscles    GASTROINTESTINAL:  Abdomen soft,   non-tender,   no guarding,   + BS    MUSCULOSKELETAL:   range of motion is not limited,  no clubbing, cyanosis    NEUROLOGICAL:   Alert and oriented   no motor or deficits.    SKIN:   Skin normal color for race,   warm, dry   No evidence of rash.      01-22-23 @ 07:01  -  01-23-23 @ 07:00  --------------------------------------------------------  IN:  Total IN: 0 mL    OUT:    Voided (mL): 1500 mL  Total OUT: 1500 mL    Total NET: -1500 mL          LABS:                          11.5   7.08  )-----------( 383      ( 23 Jan 2023 06:45 )             35.5                                               01-23    135  |  96<L>  |  10  ----------------------------<  93  4.6   |  26  |  0.6<L>    Ca    8.4      23 Jan 2023 06:45  Mg     1.7     01-23    TPro  5.7<L>  /  Alb  2.7<L>  /  TBili  0.3  /  DBili  x   /  AST  53<H>  /  ALT  48<H>  /  AlkPhos  101  01-23                                                                                           LIVER FUNCTIONS - ( 23 Jan 2023 06:45 )  Alb: 2.7 g/dL / Pro: 5.7 g/dL / ALK PHOS: 101 U/L / ALT: 48 U/L / AST: 53 U/L / GGT: x                                                                                                MEDICATIONS  (STANDING):  chlorhexidine 4% Liquid 1 Application(s) Topical <User Schedule>  enoxaparin Injectable 60 milliGRAM(s) SubCutaneous every 12 hours  furosemide   Injectable 40 milliGRAM(s) IV Push once  influenza  Vaccine (HIGH DOSE) 0.7 milliLiter(s) IntraMuscular once  levothyroxine 100 MICROGram(s) Oral daily  magnesium sulfate  IVPB 2 Gram(s) IV Intermittent once    MEDICATIONS  (PRN):      X-Rays reviewed    CXR interpreted by me: Patient is a 89y old  Female who presents with a chief complaint of PE (22 Jan 2023 10:03)        SUBJECTIVE:    On NC. Reports some SOB.    PHYSICAL EXAM  Vital Signs Last 24 Hrs  T(C): 36.3 (23 Jan 2023 05:00), Max: 36.3 (23 Jan 2023 05:00)  T(F): 97.3 (23 Jan 2023 05:00), Max: 97.3 (23 Jan 2023 05:00)  HR: 88 (23 Jan 2023 05:00) (81 - 88)  BP: 128/60 (23 Jan 2023 05:00) (128/60 - 142/74)  BP(mean): --  RR: 16 (23 Jan 2023 05:00) (16 - 18)  SpO2: 98% (22 Jan 2023 20:00) (98% - 98%)        CONSTITUTIONAL:  ill looking    ENT:   Airway patent,   No thrush    CARDIAC:   KIRBY 2.6.      RESPIRATORY:   DEC BS L base    GASTROINTESTINAL:  Abdomen soft,   non-tender,   no guarding,   + BS    MUSCULOSKELETAL:   range of motion is not limited,  no clubbing, cyanosis    NEUROLOGICAL:   Alert and oriented   no motor or deficits.        01-22-23 @ 07:01  -  01-23-23 @ 07:00  --------------------------------------------------------  IN:  Total IN: 0 mL    OUT:    Voided (mL): 1500 mL  Total OUT: 1500 mL    Total NET: -1500 mL          LABS:                          11.5   7.08  )-----------( 383      ( 23 Jan 2023 06:45 )             35.5                                               01-23    135  |  96<L>  |  10  ----------------------------<  93  4.6   |  26  |  0.6<L>    Ca    8.4      23 Jan 2023 06:45  Mg     1.7     01-23    TPro  5.7<L>  /  Alb  2.7<L>  /  TBili  0.3  /  DBili  x   /  AST  53<H>  /  ALT  48<H>  /  AlkPhos  101  01-23                                                                                           LIVER FUNCTIONS - ( 23 Jan 2023 06:45 )  Alb: 2.7 g/dL / Pro: 5.7 g/dL / ALK PHOS: 101 U/L / ALT: 48 U/L / AST: 53 U/L / GGT: x                                                                                                MEDICATIONS  (STANDING):  chlorhexidine 4% Liquid 1 Application(s) Topical <User Schedule>  enoxaparin Injectable 60 milliGRAM(s) SubCutaneous every 12 hours  furosemide   Injectable 40 milliGRAM(s) IV Push once  influenza  Vaccine (HIGH DOSE) 0.7 milliLiter(s) IntraMuscular once  levothyroxine 100 MICROGram(s) Oral daily  magnesium sulfate  IVPB 2 Gram(s) IV Intermittent once

## 2023-01-23 NOTE — PROGRESS NOTE ADULT - SUBJECTIVE AND OBJECTIVE BOX
SUNNY LOMBARDI  89y  Female      Patient is a 89y old  Female who presents with a chief complaint of PE.      INTERVAL HPI/OVERNIGHT EVENTS:      ******************************* REVIEW OF SYSTEMS:**********************************************    All other review of systems negative    *********************** VITALS ******************************************    T(F): 97.3 (01-23-23 @ 05:00)  HR: 88 (01-23-23 @ 05:00) (81 - 88)  BP: 128/60 (01-23-23 @ 05:00) (128/60 - 142/74)  RR: 16 (01-23-23 @ 05:00) (16 - 18)  SpO2: 98% (01-22-23 @ 20:00) (98% - 98%)    01-22-23 @ 07:01  -  01-23-23 @ 07:00  --------------------------------------------------------  IN: 0 mL / OUT: 1500 mL / NET: -1500 mL            01-22-23 @ 07:01  -  01-23-23 @ 07:00  --------------------------------------------------------  IN: 0 mL / OUT: 1500 mL / NET: -1500 mL        ******************************** PHYSICAL EXAM:**************************************************  GENERAL: NAD    PSYCH: no agitation, baseline mentation  HEENT:     NERVOUS SYSTEM:  Alert & Oriented X3,     PULMONARY: KEVIN, decreased on Left     CARDIOVASCULAR: S1S2 RRR    GI: Soft, NT, ND; BS present.    EXTREMITIES:  2+ Peripheral Pulses, No clubbing, cyanosis, or edema    LYMPH: No lymphadenopathy noted    SKIN: No rashes or lesions      **************************** LABS *******************************************************                          11.5   7.08  )-----------( 383      ( 23 Jan 2023 06:45 )             35.5     01-23    135  |  96<L>  |  10  ----------------------------<  93  4.6   |  26  |  0.6<L>    Ca    8.4      23 Jan 2023 06:45  Mg     1.7     01-23    TPro  5.7<L>  /  Alb  2.7<L>  /  TBili  0.3  /  DBili  x   /  AST  53<H>  /  ALT  48<H>  /  AlkPhos  101  01-23          Lactate Trend  01-18 @ 16:58 Lactate:1.7         CAPILLARY BLOOD GLUCOSE              **************************Active Medications *******************************************  No Known Allergies      chlorhexidine 4% Liquid 1 Application(s) Topical <User Schedule>  enoxaparin Injectable 60 milliGRAM(s) SubCutaneous once  influenza  Vaccine (HIGH DOSE) 0.7 milliLiter(s) IntraMuscular once  levothyroxine 100 MICROGram(s) Oral daily      ***************************************************  RADIOLOGY & ADDITIONAL TESTS:    Imaging Personally Reviewed:  [ ] YES  [ ] NO    HEALTH ISSUES - PROBLEM Dx:  Pulmonary thromboembolism

## 2023-01-23 NOTE — PHYSICAL THERAPY INITIAL EVALUATION ADULT - LIVES WITH, PROFILE
Pt lives in  with her daughter, 1 flight to enter, all rooms on one level. Pt's daughter is in hospital now with lung cancer and will be going for Rehab as per son report who is present at b/s.

## 2023-01-23 NOTE — PROGRESS NOTE ADULT - ASSESSMENT
IMPRESSION    Bilateral submassive PE unprovoked on NC  Bilateral pleural effusions  Small pericardial effusion  HO colon Ca and Thyroid Ca      RECOMMENDATIONS    Continue full anticoagulation, will need at least 3 months.  POCUS shows small left pleural effusion.  Will plan to tap tomorrow, please hold anticoagulation.  Wean oxygen, goal target 92-95%.  Patient will need to f/u with pulm and hematology after discharge. IMPRESSION    Bilateral submassive PE unprovoked on NC  Bilateral pleural effusions  Small pericardial effusion  HO colon Ca and Thyroid Ca      RECOMMENDATIONS    Continue full anticoagulation  POCUS shows small left pleural effusion.  Will plan to tap tomorrow, please hold anticoagulation.  Wean oxygen, goal target 92-95%.  Patient will need to f/u with pulm and hematology after discharge.

## 2023-01-23 NOTE — PROGRESS NOTE ADULT - ASSESSMENT
89F PMHx colon ca s/p colostomy, thyroid ca s/p resection here with acute hypoxic resp failure, found to have acute PE.    #Acute hypoxic resp failure, due to acute PE / pleural effusion   with pleuritic cp, resolved  s/p Lasix x1 yesterday. Awaiting thoracentesis tomorrow, will hold am Lovenox.    cta with bl segmental PE, RV strain  tte > shows no RV strain.   EKG noted  no intervention per IR  Currently on 2L Spo2 96%   Pulm eval noted.     #Transaminitis hepatocellular predom  mild, trend    #Colon ca  outpt f/u  #Thyroid ca  synthroid 100  #DVT ppx  lovenox    #Progress Note Handoff:  Pending (specify):  AHRF / ? Thora /PT  Family discussion: d/w pt at bedside re: treatment plan, primary dx  Disposition: Home vs STR

## 2023-01-24 LAB
ALBUMIN SERPL ELPH-MCNC: 2.8 G/DL — LOW (ref 3.5–5.2)
ALP SERPL-CCNC: 97 U/L — SIGNIFICANT CHANGE UP (ref 30–115)
ALT FLD-CCNC: 38 U/L — SIGNIFICANT CHANGE UP (ref 0–41)
ANION GAP SERPL CALC-SCNC: 11 MMOL/L — SIGNIFICANT CHANGE UP (ref 7–14)
AST SERPL-CCNC: 35 U/L — SIGNIFICANT CHANGE UP (ref 0–41)
BASOPHILS # BLD AUTO: 0.02 K/UL — SIGNIFICANT CHANGE UP (ref 0–0.2)
BASOPHILS NFR BLD AUTO: 0.3 % — SIGNIFICANT CHANGE UP (ref 0–1)
BILIRUB SERPL-MCNC: 0.3 MG/DL — SIGNIFICANT CHANGE UP (ref 0.2–1.2)
BUN SERPL-MCNC: 8 MG/DL — LOW (ref 10–20)
CALCIUM SERPL-MCNC: 8.3 MG/DL — LOW (ref 8.4–10.5)
CHLORIDE SERPL-SCNC: 94 MMOL/L — LOW (ref 98–110)
CO2 SERPL-SCNC: 30 MMOL/L — SIGNIFICANT CHANGE UP (ref 17–32)
CREAT SERPL-MCNC: 0.6 MG/DL — LOW (ref 0.7–1.5)
EGFR: 81 ML/MIN/1.73M2 — SIGNIFICANT CHANGE UP
EOSINOPHIL # BLD AUTO: 0.19 K/UL — SIGNIFICANT CHANGE UP (ref 0–0.7)
EOSINOPHIL NFR BLD AUTO: 3.2 % — SIGNIFICANT CHANGE UP (ref 0–8)
GLUCOSE SERPL-MCNC: 88 MG/DL — SIGNIFICANT CHANGE UP (ref 70–99)
HCT VFR BLD CALC: 33.3 % — LOW (ref 37–47)
HGB BLD-MCNC: 10.7 G/DL — LOW (ref 12–16)
IMM GRANULOCYTES NFR BLD AUTO: 0.7 % — HIGH (ref 0.1–0.3)
LYMPHOCYTES # BLD AUTO: 0.95 K/UL — LOW (ref 1.2–3.4)
LYMPHOCYTES # BLD AUTO: 15.8 % — LOW (ref 20.5–51.1)
MAGNESIUM SERPL-MCNC: 2.1 MG/DL — SIGNIFICANT CHANGE UP (ref 1.8–2.4)
MCHC RBC-ENTMCNC: 27.6 PG — SIGNIFICANT CHANGE UP (ref 27–31)
MCHC RBC-ENTMCNC: 32.1 G/DL — SIGNIFICANT CHANGE UP (ref 32–37)
MCV RBC AUTO: 86 FL — SIGNIFICANT CHANGE UP (ref 81–99)
MONOCYTES # BLD AUTO: 1.02 K/UL — HIGH (ref 0.1–0.6)
MONOCYTES NFR BLD AUTO: 16.9 % — HIGH (ref 1.7–9.3)
NEUTROPHILS # BLD AUTO: 3.81 K/UL — SIGNIFICANT CHANGE UP (ref 1.4–6.5)
NEUTROPHILS NFR BLD AUTO: 63.1 % — SIGNIFICANT CHANGE UP (ref 42.2–75.2)
NRBC # BLD: 0 /100 WBCS — SIGNIFICANT CHANGE UP (ref 0–0)
PLATELET # BLD AUTO: 360 K/UL — SIGNIFICANT CHANGE UP (ref 130–400)
POTASSIUM SERPL-MCNC: 4.8 MMOL/L — SIGNIFICANT CHANGE UP (ref 3.5–5)
POTASSIUM SERPL-SCNC: 4.8 MMOL/L — SIGNIFICANT CHANGE UP (ref 3.5–5)
PROT SERPL-MCNC: 5.5 G/DL — LOW (ref 6–8)
RBC # BLD: 3.87 M/UL — LOW (ref 4.2–5.4)
RBC # FLD: 14.2 % — SIGNIFICANT CHANGE UP (ref 11.5–14.5)
SARS-COV-2 RNA SPEC QL NAA+PROBE: SIGNIFICANT CHANGE UP
SODIUM SERPL-SCNC: 135 MMOL/L — SIGNIFICANT CHANGE UP (ref 135–146)
WBC # BLD: 6.03 K/UL — SIGNIFICANT CHANGE UP (ref 4.8–10.8)
WBC # FLD AUTO: 6.03 K/UL — SIGNIFICANT CHANGE UP (ref 4.8–10.8)

## 2023-01-24 PROCEDURE — 99232 SBSQ HOSP IP/OBS MODERATE 35: CPT

## 2023-01-24 PROCEDURE — 99233 SBSQ HOSP IP/OBS HIGH 50: CPT

## 2023-01-24 PROCEDURE — 71045 X-RAY EXAM CHEST 1 VIEW: CPT | Mod: 26

## 2023-01-24 RX ORDER — ENOXAPARIN SODIUM 100 MG/ML
60 INJECTION SUBCUTANEOUS ONCE
Refills: 0 | Status: COMPLETED | OUTPATIENT
Start: 2023-01-24 | End: 2023-01-24

## 2023-01-24 RX ADMIN — Medication 100 MICROGRAM(S): at 05:17

## 2023-01-24 RX ADMIN — PANTOPRAZOLE SODIUM 40 MILLIGRAM(S): 20 TABLET, DELAYED RELEASE ORAL at 05:17

## 2023-01-24 RX ADMIN — IRON SUCROSE 110 MILLIGRAM(S): 20 INJECTION, SOLUTION INTRAVENOUS at 16:25

## 2023-01-24 RX ADMIN — ENOXAPARIN SODIUM 60 MILLIGRAM(S): 100 INJECTION SUBCUTANEOUS at 21:48

## 2023-01-24 NOTE — PROGRESS NOTE ADULT - ASSESSMENT
89F PMHx colon ca s/p colostomy, thyroid ca s/p resection here with acute hypoxic resp failure, found to have acute PE.    #Acute hypoxic resp failure, due to acute PE / pleural effusion   with pleuritic cp, resolved   Awaiting thoracentesis today,   cta with bl segmental PE, RV strain  tte > shows no RV strain.   EKG noted  no intervention per IR  Currently on 2L Spo2 96%  - 98%  Pulm eval noted.     #Transaminitis hepatocellular predom  mild, trend    #Colon ca  outpt f/u  #Thyroid ca  synthroid 100  #DVT ppx  lovenox    #Progress Note Handoff  Pending (specify):  AHRF /  Joshuaa   Family discussion: d/w pt at bedside re: treatment plan,  Disposition: SNF

## 2023-01-24 NOTE — PROGRESS NOTE ADULT - SUBJECTIVE AND OBJECTIVE BOX
SUNNY LOMBARDI  89y  Female      Patient is a 89y old  Female who presents with a chief complaint of PE.      INTERVAL HPI/OVERNIGHT EVENTS:      ******************************* REVIEW OF SYSTEMS:**********************************************    All other review of systems negative    *********************** VITALS ******************************************    T(F): 97.1 (01-24-23 @ 05:00)  HR: 77 (01-24-23 @ 05:00) (74 - 77)  BP: 118/58 (01-24-23 @ 05:00) (111/56 - 121/58)  RR: 18 (01-24-23 @ 05:00) (18 - 19)  SpO2: 97% (01-23-23 @ 20:00) (97% - 97%)    01-23-23 @ 07:01  -  01-24-23 @ 07:00  --------------------------------------------------------  IN: 0 mL / OUT: 1550 mL / NET: -1550 mL    01-24-23 @ 07:01  -  01-24-23 @ 10:59  --------------------------------------------------------  IN: 0 mL / OUT: 1000 mL / NET: -1000 mL            01-23-23 @ 07:01  -  01-24-23 @ 07:00  --------------------------------------------------------  IN: 0 mL / OUT: 1550 mL / NET: -1550 mL    01-24-23 @ 07:01  -  01-24-23 @ 10:59  --------------------------------------------------------  IN: 0 mL / OUT: 1000 mL / NET: -1000 mL        ******************************** PHYSICAL EXAM:**************************************************  GENERAL: NAD    PSYCH: no agitation, baseline mentation  HEENT:     NERVOUS SYSTEM:  Alert & Oriented X3,     PULMONARY: KEVIN, LOW ON LEFT     CARDIOVASCULAR: S1S2 RRR    GI: Soft, NT, ND; BS present.    EXTREMITIES:  2+ Peripheral Pulses, No clubbing, cyanosis, or edema    LYMPH: No lymphadenopathy noted    SKIN: No rashes or lesions      **************************** LABS *******************************************************                          10.7   6.03  )-----------( 360      ( 24 Jan 2023 07:48 )             33.3     01-24    135  |  94<L>  |  8<L>  ----------------------------<  88  4.8   |  30  |  0.6<L>    Ca    8.3<L>      24 Jan 2023 07:48  Mg     2.1     01-24    TPro  5.5<L>  /  Alb  2.8<L>  /  TBili  0.3  /  DBili  x   /  AST  35  /  ALT  38  /  AlkPhos  97  01-24          Lactate Trend        CAPILLARY BLOOD GLUCOSE              **************************Active Medications *******************************************  No Known Allergies      chlorhexidine 4% Liquid 1 Application(s) Topical <User Schedule>  enoxaparin Injectable 60 milliGRAM(s) SubCutaneous every 12 hours  influenza  Vaccine (HIGH DOSE) 0.7 milliLiter(s) IntraMuscular once  iron sucrose IVPB 200 milliGRAM(s) IV Intermittent every 24 hours  levothyroxine 100 MICROGram(s) Oral daily  pantoprazole    Tablet 40 milliGRAM(s) Oral before breakfast      ***************************************************  RADIOLOGY & ADDITIONAL TESTS:    Imaging Personally Reviewed:  [ ] YES  [ ] NO    HEALTH ISSUES - PROBLEM Dx:  Pulmonary thromboembolism

## 2023-01-24 NOTE — MEDICAL STUDENT PROGRESS NOTE(EDUCATION) - NS MD HP STUD ASPLAN ASSES FT
89 yo F with PMH of colon cancer (s/p colostomy), thyroid cancer (s/p resection) presenting for new onset acute SOB, admitted for acute hypoxic respiratory failure in the setting of acute pulmonary embolism with b/l pleural effusions.

## 2023-01-24 NOTE — MEDICAL STUDENT PROGRESS NOTE(EDUCATION) - SUBJECTIVE AND OBJECTIVE BOX
SUNNY LOMBARDI 89y Female  MRN#: [id number]   Hospital Day: 6d    HPI:  89 year old female history of colon CA in remission status post colostomy, thyroid cancer s/p resection, hypothyroidism presents for shortness of breath. For about a week has had upper respiratory symptoms in addition to mild shortness of breath. About a month ago went to PCP who started on course of oral abx for suspected pneumonia, which she completed. Yesterday she went to PCP again and ordered CXR and CT scan. Today felt more short of breath so came to the ED. Was found to be satting in the lower 90's in the ED. Placed on 2L NC with improvement. CXR showed cardiomegaly and retro cardiac opacification. CT PE revealed PE with possible right heart strain. IR was consulted and recommend no intervention at this time. Plan to continue with heparin ggt. Denies chest pain, abdominal pain, palpitations, nausea vomitting, numbness, tingling, headache, fever chills.    In the ED vitals T 99 , /80, SPO2 100% on 2L NC.  EKG sinus tachycardia   Labs show trops 0.13, BNP 1977, Hb 9.7 Na 128, K 5.6   CT PE: Acute pulmonary emboli throughout bilateral lobar and segmental pulmonary arteries (near complete occlusion of left lower lobe pulmonary arteries). RV:LV ratio greater than 1 with reflux of contrast to IVC, suspicious for right heart strain. Small bilateral pleural effusions with overlying compressive atelectasis. Left lower lobe opacification, which may represent combination of atelectasis and infarction, in the appropriate clinical   setting. Small pericardial effusion, measuring up to 1.4 cm in thickness.   (18 Jan 2023 20:51)      SUBJECTIVE  AAOx3. no overnight events or new complaints. Patient received one dose of Lasix overnight,    OBJECTIVE  PAST MEDICAL & SURGICAL HISTORY  Hypothyroidism    History of colon cancer    Colostomy present      ALLERGIES:  No Known Allergies    MEDICATIONS:  MEDICATIONS  (STANDING):  chlorhexidine 4% Liquid 1 Application(s) Topical <User Schedule>  enoxaparin Injectable 60 milliGRAM(s) SubCutaneous every 12 hours  influenza  Vaccine (HIGH DOSE) 0.7 milliLiter(s) IntraMuscular once  iron sucrose IVPB 200 milliGRAM(s) IV Intermittent every 24 hours  levothyroxine 100 MICROGram(s) Oral daily  pantoprazole    Tablet 40 milliGRAM(s) Oral before breakfast    MEDICATIONS  (PRN): none    VITAL SIGNS: Last 24 Hours  ICU Vital Signs Last 24 Hrs  T(C): 36.2 (24 Jan 2023 05:00), Max: 36.6 (23 Jan 2023 20:00)  T(F): 97.1 (24 Jan 2023 05:00), Max: 97.9 (23 Jan 2023 20:00)  HR: 77 (24 Jan 2023 05:00) (74 - 77)  BP: 118/58 (24 Jan 2023 05:00) (111/56 - 121/58)    RR: 18 (24 Jan 2023 05:00) (18 - 19)  SpO2: 97% (23 Jan 2023 20:00) (97% - 97%)      LABS:                        11.5   7.08  )-----------( 383      ( 23 Jan 2023 06:45 )             35.5     01-23    135  |  96<L>  |  10  ----------------------------<  93  4.6   |  26  |  0.6<L>    Ca    8.4      23 Jan 2023 06:45  Mg     1.7     01-23    TPro  5.7<L>  /  Alb  2.7<L>  /  TBili  0.3  /  DBili  x   /  AST  53<H>  /  ALT  48<H>  /  AlkPhos  101  01-23      PHYSICAL EXAM:  GENERAL: NAD, well-developed.  HEAD:  Atraumatic, Normocephalic.  EYES: conjunctiva and sclera clear  CHEST/LUNG: decreased air entry on LLL  HEART: regular rate and rhythm; S1/S2.  ABDOMEN: Soft, Nontender, Nondistended  EXTREMITIES: No edema.   PSYCH: AAOx3.  NEUROLOGY: non-focal; moves all extremities       SUNNY LOMBARDI 89y Female  MRN#: [id number]   Hospital Day: 6d    HPI:  89 year old female history of colon CA in remission status post colostomy, thyroid cancer s/p resection, hypothyroidism presents for shortness of breath. For about a week has had upper respiratory symptoms in addition to mild shortness of breath. About a month ago went to PCP who started on course of oral abx for suspected pneumonia, which she completed. Yesterday she went to PCP again and ordered CXR and CT scan. Today felt more short of breath so came to the ED. Was found to be satting in the lower 90's in the ED. Placed on 2L NC with improvement. CXR showed cardiomegaly and retro cardiac opacification. CT PE revealed PE with possible right heart strain. IR was consulted and recommend no intervention at this time. Plan to continue with heparin ggt. Denies chest pain, abdominal pain, palpitations, nausea vomitting, numbness, tingling, headache, fever chills.    In the ED vitals T 99 , /80, SPO2 100% on 2L NC.  EKG sinus tachycardia   Labs show trops 0.13, BNP 1977, Hb 9.7 Na 128, K 5.6   CT PE: Acute pulmonary emboli throughout bilateral lobar and segmental pulmonary arteries (near complete occlusion of left lower lobe pulmonary arteries). RV:LV ratio greater than 1 with reflux of contrast to IVC, suspicious for right heart strain. Small bilateral pleural effusions with overlying compressive atelectasis. Left lower lobe opacification, which may represent combination of atelectasis and infarction, in the appropriate clinical   setting. Small pericardial effusion, measuring up to 1.4 cm in thickness.   (18 Jan 2023 20:51)      SUBJECTIVE  AAOx3. no overnight events or new complaints. Patient received one dose of Lasix overnight and is scheduled for thoracentesis today for b/l pleural effusions.    OBJECTIVE  PAST MEDICAL & SURGICAL HISTORY  Hypothyroidism    History of colon cancer    Colostomy present      ALLERGIES:  No Known Allergies    MEDICATIONS:  MEDICATIONS  (STANDING):  chlorhexidine 4% Liquid 1 Application(s) Topical <User Schedule>  enoxaparin Injectable 60 milliGRAM(s) SubCutaneous every 12 hours  influenza  Vaccine (HIGH DOSE) 0.7 milliLiter(s) IntraMuscular once  iron sucrose IVPB 200 milliGRAM(s) IV Intermittent every 24 hours  levothyroxine 100 MICROGram(s) Oral daily  pantoprazole    Tablet 40 milliGRAM(s) Oral before breakfast    MEDICATIONS  (PRN): none    VITAL SIGNS: Last 24 Hours  ICU Vital Signs Last 24 Hrs  T(C): 36.2 (24 Jan 2023 05:00), Max: 36.6 (23 Jan 2023 20:00)  T(F): 97.1 (24 Jan 2023 05:00), Max: 97.9 (23 Jan 2023 20:00)  HR: 77 (24 Jan 2023 05:00) (74 - 77)  BP: 118/58 (24 Jan 2023 05:00) (111/56 - 121/58)    RR: 18 (24 Jan 2023 05:00) (18 - 19)  SpO2: 97% (23 Jan 2023 20:00) (97% - 97%)      LABS:                        11.5   7.08  )-----------( 383      ( 23 Jan 2023 06:45 )             35.5     01-23    135  |  96<L>  |  10  ----------------------------<  93  4.6   |  26  |  0.6<L>    Ca    8.4      23 Jan 2023 06:45  Mg     1.7     01-23    TPro  5.7<L>  /  Alb  2.7<L>  /  TBili  0.3  /  DBili  x   /  AST  53<H>  /  ALT  48<H>  /  AlkPhos  101  01-23      PHYSICAL EXAM:  GENERAL: NAD, well-developed.  HEAD:  Atraumatic, Normocephalic.  EYES: conjunctiva and sclera clear  CHEST/LUNG: decreased air entry on LLL  HEART: regular rate and rhythm; S1/S2.  ABDOMEN: Soft, Nontender, Nondistended  EXTREMITIES: No edema.   PSYCH: AAOx3.  NEUROLOGY: non-focal; moves all extremities

## 2023-01-24 NOTE — PROGRESS NOTE ADULT - SUBJECTIVE AND OBJECTIVE BOX
Patient is a 89y old  Female who presents with a chief complaint of PE (23 Jan 2023 15:18)        Over Night Events:    No fevers noted   CXR with left effusion   Plan for thoracentesis today if patient and family agrees         ROS:  See HPI    PHYSICAL EXAM    ICU Vital Signs Last 24 Hrs  T(C): 36.2 (24 Jan 2023 05:00), Max: 36.6 (23 Jan 2023 20:00)  T(F): 97.1 (24 Jan 2023 05:00), Max: 97.9 (23 Jan 2023 20:00)  HR: 77 (24 Jan 2023 05:00) (74 - 77)  BP: 118/58 (24 Jan 2023 05:00) (111/56 - 121/58)  BP(mean): --  ABP: --  ABP(mean): --  RR: 18 (24 Jan 2023 05:00) (18 - 19)  SpO2: 97% (23 Jan 2023 20:00) (97% - 97%)        General: NAD   HEENT: OLIVER             Lymphatic system: No cervical LN   Lungs: Bilateral BS, decreased on the left   Cardiovascular: Regular   Gastrointestinal: Soft, Positive BS  Extremities: No clubbing.  Moves extremities.  Full Range of motion   Skin: Warm, intact  Neurological: No motor or sensory deficit       01-23-23 @ 07:01  -  01-24-23 @ 07:00  --------------------------------------------------------  IN:  Total IN: 0 mL    OUT:    Voided (mL): 1550 mL  Total OUT: 1550 mL    Total NET: -1550 mL      01-24-23 @ 07:01  -  01-24-23 @ 10:07  --------------------------------------------------------  IN:  Total IN: 0 mL    OUT:    Voided (mL): 1000 mL  Total OUT: 1000 mL    Total NET: -1000 mL          LABS:                            10.7   6.03  )-----------( 360      ( 24 Jan 2023 07:48 )             33.3                                               01-24    135  |  94<L>  |  8<L>  ----------------------------<  88  4.8   |  30  |  0.6<L>    Ca    8.3<L>      24 Jan 2023 07:48  Mg     2.1     01-24    TPro  5.5<L>  /  Alb  2.8<L>  /  TBili  0.3  /  DBili  x   /  AST  35  /  ALT  38  /  AlkPhos  97  01-24                                                                                           LIVER FUNCTIONS - ( 24 Jan 2023 07:48 )  Alb: 2.8 g/dL / Pro: 5.5 g/dL / ALK PHOS: 97 U/L / ALT: 38 U/L / AST: 35 U/L / GGT: x                                                                                                                                       MEDICATIONS  (STANDING):  chlorhexidine 4% Liquid 1 Application(s) Topical <User Schedule>  enoxaparin Injectable 60 milliGRAM(s) SubCutaneous every 12 hours  influenza  Vaccine (HIGH DOSE) 0.7 milliLiter(s) IntraMuscular once  iron sucrose IVPB 200 milliGRAM(s) IV Intermittent every 24 hours  levothyroxine 100 MICROGram(s) Oral daily  pantoprazole    Tablet 40 milliGRAM(s) Oral before breakfast    MEDICATIONS  (PRN):      Xrays: Left opacity/effusion                                                                                     ECHO

## 2023-01-24 NOTE — MEDICAL STUDENT PROGRESS NOTE(EDUCATION) - NS MD HP STUD ASPLAN PLAN FT
#Acute hypoxic respiratory failure 2/2 acute PE/pleural effusion   with pleuritic cp, resolved  CTA 1/18 with b/l segmental PE, RV strain  TTE 1/20 showed EF 45-50%, Mod AS, no RV strain  EKG stable  no intervention per IR  Currently on 2L Spo2 96%   Pulm eval noted: s/p Lasix x1 on 1/22 and 1/23. Thoracentesis planned for1/24, will am Lovenox dose held, on Lovenox therapeutic 60 bid  Resume eliquis after thoracentesis    #Transaminitis hepatocellular predominant  mild, continue trend    #thyroid cancer s/p resection  synthroid 100     #Colon ca  outpt f/u    #LUBA  venofer 200 for 2 days  folate and b12 normal    #DVT ppx lovenox 60 bid  GI PPx protonix 40 daily  activity IAT. PT eval : subacute rehab  code: DNI / DNR  dispo: pending tap and discharge planning , weaning off O2 #Acute hypoxic respiratory failure 2/2 acute PE/pleural effusion   with pleuritic cp, resolved  CTA 1/18 with b/l segmental PE, RV strain  TTE 1/20 showed EF 45-50%, Mod AS, no RV strain  EKG stable  BCx negative 18th  no intervention per IR  Currently on 2L Spo2 96% --> wean off as tolerated   Pulm eval noted: s/p Lasix x1 on 1/22 and 1/23. Thoracentesis planned for1/24,rescheduled til 1/25  will am Lovenox dose held  Resume eliquis after thoracentesis and CXR    #Transaminitis hepatocellular predominant-->resolved     #thyroid cancer s/p resection --> synthroid 100     #Colon ca -->outpt f/u    #LUBA --> venofer 200 for 2 days  folate and b12 normal    #DVT ppx lovenox 60 bid. hold am dose  GI PPx protonix 40 daily  activity IAT. PT eval : subacute rehab  code: DNI / DNR  dispo: pending tap and discharge planning , weaning off O2

## 2023-01-24 NOTE — PROGRESS NOTE ADULT - ASSESSMENT
IMPRESSION    Bilateral submassive PE unprovoked on NC  Bilateral pleural effusions  Small pericardial effusion  HO colon Ca and Thyroid Ca      RECOMMENDATIONS    Continue full anticoagulation  POCUS shows moderate left effusion  Resume AC this evening after thora is done   Will plan for thoracentesis today   Wean oxygen, goal target 92-95%.  Outpatient follow up

## 2023-01-25 ENCOUNTER — TRANSCRIPTION ENCOUNTER (OUTPATIENT)
Age: 88
End: 2023-01-25

## 2023-01-25 LAB
ALBUMIN FLD-MCNC: 1.7 G/DL — SIGNIFICANT CHANGE UP
ANION GAP SERPL CALC-SCNC: 12 MMOL/L — SIGNIFICANT CHANGE UP (ref 7–14)
B PERT IGG+IGM PNL SER: ABNORMAL
BUN SERPL-MCNC: 7 MG/DL — LOW (ref 10–20)
CALCIUM SERPL-MCNC: 7.9 MG/DL — LOW (ref 8.4–10.5)
CHLORIDE SERPL-SCNC: 95 MMOL/L — LOW (ref 98–110)
CO2 SERPL-SCNC: 27 MMOL/L — SIGNIFICANT CHANGE UP (ref 17–32)
COLOR FLD: YELLOW — SIGNIFICANT CHANGE UP
CREAT SERPL-MCNC: 0.5 MG/DL — LOW (ref 0.7–1.5)
EGFR: 85 ML/MIN/1.73M2 — SIGNIFICANT CHANGE UP
FLUID INTAKE SUBSTANCE CLASS: SIGNIFICANT CHANGE UP
GLUCOSE FLD-MCNC: 114 MG/DL — SIGNIFICANT CHANGE UP
GLUCOSE SERPL-MCNC: 88 MG/DL — SIGNIFICANT CHANGE UP (ref 70–99)
GRAM STN FLD: SIGNIFICANT CHANGE UP
HCT VFR BLD CALC: 33.2 % — LOW (ref 37–47)
HGB BLD-MCNC: 10.6 G/DL — LOW (ref 12–16)
LDH SERPL L TO P-CCNC: 149 U/L — SIGNIFICANT CHANGE UP
LYMPHOCYTES # FLD: 46 — SIGNIFICANT CHANGE UP
MAGNESIUM SERPL-MCNC: 2 MG/DL — SIGNIFICANT CHANGE UP (ref 1.8–2.4)
MCHC RBC-ENTMCNC: 27.5 PG — SIGNIFICANT CHANGE UP (ref 27–31)
MCHC RBC-ENTMCNC: 31.9 G/DL — LOW (ref 32–37)
MCV RBC AUTO: 86.2 FL — SIGNIFICANT CHANGE UP (ref 81–99)
MONOS+MACROS # FLD: 42 % — SIGNIFICANT CHANGE UP
NEUTROPHILS-BODY FLUID: 12 % — SIGNIFICANT CHANGE UP
NRBC # BLD: 0 /100 WBCS — SIGNIFICANT CHANGE UP (ref 0–0)
PLATELET # BLD AUTO: 343 K/UL — SIGNIFICANT CHANGE UP (ref 130–400)
POTASSIUM SERPL-MCNC: 4.6 MMOL/L — SIGNIFICANT CHANGE UP (ref 3.5–5)
POTASSIUM SERPL-SCNC: 4.6 MMOL/L — SIGNIFICANT CHANGE UP (ref 3.5–5)
PROT FLD-MCNC: 3.1 G/DL — SIGNIFICANT CHANGE UP
RBC # BLD: 3.85 M/UL — LOW (ref 4.2–5.4)
RBC # FLD: 14.2 % — SIGNIFICANT CHANGE UP (ref 11.5–14.5)
RCV VOL RI: 4000 /UL — HIGH (ref 0–0)
SODIUM SERPL-SCNC: 134 MMOL/L — LOW (ref 135–146)
SPECIMEN SOURCE: SIGNIFICANT CHANGE UP
TOTAL NUCLEATED CELL COUNT, BODY FLUID: 640 /UL — SIGNIFICANT CHANGE UP
TUBE TYPE: SIGNIFICANT CHANGE UP
WBC # BLD: 5.87 K/UL — SIGNIFICANT CHANGE UP (ref 4.8–10.8)
WBC # FLD AUTO: 5.87 K/UL — SIGNIFICANT CHANGE UP (ref 4.8–10.8)

## 2023-01-25 PROCEDURE — 88112 CYTOPATH CELL ENHANCE TECH: CPT | Mod: 26

## 2023-01-25 PROCEDURE — 99232 SBSQ HOSP IP/OBS MODERATE 35: CPT

## 2023-01-25 PROCEDURE — 88305 TISSUE EXAM BY PATHOLOGIST: CPT | Mod: 26

## 2023-01-25 PROCEDURE — 71045 X-RAY EXAM CHEST 1 VIEW: CPT | Mod: 26

## 2023-01-25 PROCEDURE — 99233 SBSQ HOSP IP/OBS HIGH 50: CPT

## 2023-01-25 RX ORDER — PANTOPRAZOLE SODIUM 20 MG/1
1 TABLET, DELAYED RELEASE ORAL
Qty: 0 | Refills: 0 | DISCHARGE
Start: 2023-01-25

## 2023-01-25 RX ORDER — APIXABAN 2.5 MG/1
2 TABLET, FILM COATED ORAL
Qty: 0 | Refills: 0 | DISCHARGE
Start: 2023-01-25 | End: 2023-01-31

## 2023-01-25 RX ORDER — APIXABAN 2.5 MG/1
10 TABLET, FILM COATED ORAL EVERY 12 HOURS
Refills: 0 | Status: DISCONTINUED | OUTPATIENT
Start: 2023-01-25 | End: 2023-01-26

## 2023-01-25 RX ADMIN — Medication 100 MICROGRAM(S): at 05:12

## 2023-01-25 RX ADMIN — PANTOPRAZOLE SODIUM 40 MILLIGRAM(S): 20 TABLET, DELAYED RELEASE ORAL at 05:12

## 2023-01-25 RX ADMIN — IRON SUCROSE 110 MILLIGRAM(S): 20 INJECTION, SOLUTION INTRAVENOUS at 18:46

## 2023-01-25 RX ADMIN — APIXABAN 10 MILLIGRAM(S): 2.5 TABLET, FILM COATED ORAL at 17:42

## 2023-01-25 NOTE — DISCHARGE NOTE PROVIDER - NSDCCPCAREPLAN_GEN_ALL_CORE_FT
PRINCIPAL DISCHARGE DIAGNOSIS  Diagnosis: Pulmonary embolism  Assessment and Plan of Treatment: you were admiitted with shortness of breath and found to have pulmonary embolism with Right pleural effsuion.  Youwere treated with appropriate anticoagulation, evaluated by interventional radiology--> no indication for intervention  you underwent thoracentesis and drained 600mL of fluid from your right lung  You were started on eliquis   Follow up with Pulm outpatient and continue your medications and precribed

## 2023-01-25 NOTE — PROGRESS NOTE ADULT - ATTENDING COMMENTS
#Acute hypoxic resp failure, due to acute PE / pleural effusion   sp thoracentesis   cont ac     # thyroid cancer s/p resection  synthroid 100     # Colon ca  outpt f/u    # anemia, chronic disease   Ferritin, Serum: 793 ng/mL (01.19.23 @ 06:42)  Iron Total, Serum: 27 ug/dL (01.19.23 @ 06:42)  Vitamin B12, Serum: 1427 pg/mL (01.19.23 @ 06:42)  Folate, Serum: 5.0 ng/mL (01.19.23 @ 06:42)    monitor cbc     #Progress Note Handoff  Pending (specify): discharge planning, anticipate dc in am   Family discussion: resident is updating family   Disposition: snf #Acute hypoxic resp failure, due to acute PE / pleural effusion   sp thoracentesis   dw Pulmonary   repeat cxr in am     # thyroid cancer s/p resection  synthroid 100     # Colon ca  outpt f/u    # anemia, chronic disease   Ferritin, Serum: 793 ng/mL (01.19.23 @ 06:42)  Iron Total, Serum: 27 ug/dL (01.19.23 @ 06:42)  Vitamin B12, Serum: 1427 pg/mL (01.19.23 @ 06:42)  Folate, Serum: 5.0 ng/mL (01.19.23 @ 06:42)    monitor cbc     #Progress Note Handoff  Pending (specify): cxr in am, cbc in am discharge planning, anticipate dc in am   Family discussion: resident is updating family   Disposition: snf

## 2023-01-25 NOTE — PROGRESS NOTE ADULT - PROVIDER SPECIALTY LIST ADULT
Hospitalist
Pulmonology
Hospitalist
Internal Medicine
Hospitalist
Hospitalist
Pulmonology
Pulmonology
Critical Care
Internal Medicine

## 2023-01-25 NOTE — PROGRESS NOTE ADULT - SUBJECTIVE AND OBJECTIVE BOX
SUNNY LOMBARDI 89y Female  MRN#: [id number]   Hospital Day: 7d    HPI:  89 year old female history of colon CA in remission status post colostomy, thyroid cancer s/p resection, hypothyroidism presents for shortness of breath. For about a week has had upper respiratory symptoms in addition to mild shortness of breath. About a month ago went to PCP who started on course of oral abx for suspected pneumonia, which she completed. yesterday she went to PCP again and ordered CXR and CT scan. Today felt more short of breath so came to the ED. Was found to be satting in the lower 90's in the ED. Placed on 2L NC with improvement. CXR showed cardiomegaly and retro cardiac opacification. CT PE revealed PE with possible right heart strain. IR was consulted and recommend no intervention at this time. Dutton to continue with heparin ggt. Denies chest pain, abdominal pain, palpitations, nausea vomitting, numbness, tingling, headache, fever chills.    In the ED vitals T 99 , /80, SPO2 100% on 2L NC.  EKG sinus tachycardia   Labs show trops 0.13, BNP 1977, Hb 9.7 Na 128, K 5.6   CT PE: Acute pulmonary emboli throughout bilateral lobar and segmental pulmonary arteries (near complete occlusion of left lower lobe pulmonary arteries). RV:LV ratio greater than 1 with reflux of contrast to IVC, suspicious for right heart strain. Small bilateral pleural effusions with overlying compressive atelectasis. Left lower lobe opacification, which may represent combination of atelectasis and infarction, in the appropriate clinical   setting. Small pericardial effusion, measuring up to 1.4 cm in thickness.   (18 Jan 2023 20:51)      SUBJECTIVE  AAOx3. no overnight events. no complaints.    OBJECTIVE  PAST MEDICAL & SURGICAL HISTORY  Hypothyroidism    History of colon cancer    Colostomy present      ALLERGIES:  No Known Allergies    MEDICATIONS:  STANDING MEDICATIONS  apixaban 10 milliGRAM(s) Oral every 12 hours  chlorhexidine 4% Liquid 1 Application(s) Topical <User Schedule>  influenza  Vaccine (HIGH DOSE) 0.7 milliLiter(s) IntraMuscular once  iron sucrose IVPB 200 milliGRAM(s) IV Intermittent every 24 hours  levothyroxine 100 MICROGram(s) Oral daily  pantoprazole    Tablet 40 milliGRAM(s) Oral before breakfast    PRN MEDICATIONS      VITAL SIGNS: Last 24 Hours  T(C): 36.1 (25 Jan 2023 12:50), Max: 36.6 (25 Jan 2023 05:00)  T(F): 97 (25 Jan 2023 12:50), Max: 97.8 (25 Jan 2023 05:00)  HR: 84 (25 Jan 2023 12:50) (77 - 84)  BP: 123/56 (25 Jan 2023 12:50) (107/52 - 123/56)  BP(mean): --  RR: 18 (25 Jan 2023 12:50) (18 - 18)  SpO2: 99% (24 Jan 2023 19:30) (99% - 99%)    LABS:                        10.6   5.87  )-----------( 343      ( 25 Jan 2023 06:36 )             33.2     01-25    134<L>  |  95<L>  |  7<L>  ----------------------------<  88  4.6   |  27  |  0.5<L>    Ca    7.9<L>      25 Jan 2023 06:36  Mg     2.0     01-25    TPro  5.5<L>  /  Alb  2.8<L>  /  TBili  0.3  /  DBili  x   /  AST  35  /  ALT  38  /  AlkPhos  97  01-24                      PHYSICAL EXAM:  GENERAL: NAD, well-developed.  HEAD:  Atraumatic, Normocephalic.  EYES: conjunctiva and sclera clear  CHEST/LUNG: decreased air entry on LLL  HEART: regular rate and rhythm; S1/S2.  ABDOMEN: Soft, Nontender, Nondistended  EXTREMITIES: No edema.   PSYCH: AAOx3.  NEUROLOGY: non-focal; moves all extremities    ASSESSMENT AND PLAN:    #Acute hypoxic resp failure, due to acute PE / pleural effusion   with pleuritic cp, resolved  CTA 1/18 with bl segmental PE, RV strain  TTE 1/20 showed EF 45-50%, Mod AS, no RV strain  EKG stable  no intervention per IR  Currently on 2L Spo2 96% --> will try to wean off   Pulm eval noted: s/p Lasix x1 on 1/22 and 1/23. s/p Throacentesis 1/25 with 600mL drained--> CXR improved   was on Lovenox therapeutic 60 bid--> started on eliquis 10 BID on 1/25-->1/31 then will resume 5 BID  f/u o/p with pulm    #Transaminitis hepatocellular predom  mild, trend    #thyroid cancer s/p resection  synthroid 100     #Colon ca  outpt f/u    #LUBA  venofer 200 for 3 days  folate and b12 normal    #DVT ppx eliquis   GI PPx protonix 40 daily  activity IAT. PT eval : subacute rehab  code: DNI / DNR  pending: weaning off O2 and d/c to SNF

## 2023-01-25 NOTE — DISCHARGE NOTE PROVIDER - ATTENDING DISCHARGE PHYSICAL EXAMINATION:
T(F): 96.4 (01-26-23 @ 04:30), Max: 97.5 (01-25-23 @ 19:00)  HR: 78 (01-26-23 @ 04:30) (78 - 86)  BP: 116/55 (01-26-23 @ 04:30) (99/65 - 123/56)  RR: 18 (01-26-23 @ 04:30) (17 - 18)  SpO2: 97% (01-25-23 @ 19:00) (93% - 97%)    Physical exam:   constitutional NAD, frail,  AAOX3, Respiratory  lungs CTA, CVS heart RRR, GI: abdomen Soft NT, ND, BS+, skin: intact  neuro exam Motor, sensory and CN normal, no deficit

## 2023-01-25 NOTE — DISCHARGE NOTE PROVIDER - NSDCMRMEDTOKEN_GEN_ALL_CORE_FT
apixaban 5 mg oral tablet: 2 tab(s) orally every 12 hours  for 7 days   Then 1 tab BID until Pulm follow up  pantoprazole 40 mg oral delayed release tablet: 1 tab(s) orally once a day (before a meal)  Synthroid 100 mcg (0.1 mg) oral tablet: 1 tab(s) orally once a day

## 2023-01-25 NOTE — PROGRESS NOTE ADULT - SUBJECTIVE AND OBJECTIVE BOX
Patient is a 89y old  Female who presents with a chief complaint of PE (24 Jan 2023 10:59)        Over Night Events:    On oxygen via nasal canula   No fevers         ROS:  See HPI    PHYSICAL EXAM    ICU Vital Signs Last 24 Hrs  T(C): 36.6 (25 Jan 2023 05:00), Max: 36.6 (25 Jan 2023 05:00)  T(F): 97.8 (25 Jan 2023 05:00), Max: 97.8 (25 Jan 2023 05:00)  HR: 77 (25 Jan 2023 05:00) (74 - 84)  BP: 121/58 (25 Jan 2023 05:00) (107/52 - 136/64)  BP(mean): --  ABP: --  ABP(mean): --  RR: 18 (25 Jan 2023 05:00) (18 - 18)  SpO2: 99% (24 Jan 2023 19:30) (99% - 99%)    O2 Parameters below as of 24 Jan 2023 19:30  Patient On (Oxygen Delivery Method): room air            General:  HEENT: OLIVER             Lymphatic system: No cervical LN   Lungs: Bilateral BS, decreased left side  Cardiovascular: Regular   Gastrointestinal: Soft, Positive BS  Extremities: No clubbing.  Moves extremities.  Full Range of motion   Skin: Warm, intact  Neurological: No motor or sensory deficit       01-24-23 @ 07:01  -  01-25-23 @ 07:00  --------------------------------------------------------  IN:  Total IN: 0 mL    OUT:    Voided (mL): 1000 mL  Total OUT: 1000 mL    Total NET: -1000 mL          LABS:                            10.7   6.03  )-----------( 360      ( 24 Jan 2023 07:48 )             33.3                                               01-24    135  |  94<L>  |  8<L>  ----------------------------<  88  4.8   |  30  |  0.6<L>    Ca    8.3<L>      24 Jan 2023 07:48  Mg     2.1     01-24    TPro  5.5<L>  /  Alb  2.8<L>  /  TBili  0.3  /  DBili  x   /  AST  35  /  ALT  38  /  AlkPhos  97  01-24                                                                                           LIVER FUNCTIONS - ( 24 Jan 2023 07:48 )  Alb: 2.8 g/dL / Pro: 5.5 g/dL / ALK PHOS: 97 U/L / ALT: 38 U/L / AST: 35 U/L / GGT: x                                                                                                                                       MEDICATIONS  (STANDING):    chlorhexidine 4% Liquid 1 Application(s) Topical <User Schedule>  influenza  Vaccine (HIGH DOSE) 0.7 milliLiter(s) IntraMuscular once  iron sucrose IVPB 200 milliGRAM(s) IV Intermittent every 24 hours  levothyroxine 100 MICROGram(s) Oral daily  pantoprazole    Tablet 40 milliGRAM(s) Oral before breakfast    MEDICATIONS  (PRN):      Xrays: Left basilar opacity/effusion                                                                                    ECHO

## 2023-01-25 NOTE — DISCHARGE NOTE PROVIDER - DATE OF DISCHARGE SERVICE:
Detail Level: Detailed Quality 47: Advance Care Plan: Advance Care Planning discussed and documented in the medical record; patient did not wish or was not able to name a surrogate decision maker or provide an advance care plan. Quality 130: Documentation Of Current Medications In The Medical Record: Current Medications Documented Quality 431: Preventive Care And Screening: Unhealthy Alcohol Use - Screening: Patient screened for unhealthy alcohol use using a single question and scores less than 2 times per year Quality 226: Preventive Care And Screening: Tobacco Use: Screening And Cessation Intervention: Patient screened for tobacco use and is an ex/non-smoker 26-Jan-2023

## 2023-01-25 NOTE — PROGRESS NOTE ADULT - ASSESSMENT
IMPRESSION    Bilateral submassive PE unprovoked on NC  Bilateral pleural effusions  Small pericardial effusion  HO colon Ca and Thyroid Ca      RECOMMENDATIONS    CXR reviewed with left opacity/effusion  POCUS shows moderate left effusion  Plan for thoracentesis this morning  May resume AC in the evening  Encourage incentive spirometry, ambulation as tolerated   Wean oxygen, goal target 92-95%.  Outpatient follow up

## 2023-01-25 NOTE — DISCHARGE NOTE PROVIDER - HOSPITAL COURSE
89 year old female history of colon CA in remission status post colostomy, thyroid cancer s/p resection, hypothyroidism presents for shortness of breath. For about a week has had upper respiratory symptoms in addition to mild shortness of breath. About a month ago went to PCP who started on course of oral abx for suspected pneumonia, which she completed. yesterday she went to PCP again and ordered CXR and CT scan. Today felt more short of breath so came to the ED. Was found to be satting in the lower 90's in the ED. Placed on 2L NC with improvement. CXR showed cardiomegaly and retro cardiac opacification. CT PE revealed PE with possible right heart strain. IR was consulted and recommend no intervention at this time. Dutton to continue with heparin ggt. Denies chest pain, abdominal pain, palpitations, nausea vomitting, numbness, tingling, headache, fever chills.    In the ED vitals T 99 , /80, SPO2 100% on 2L NC.  EKG sinus tachycardia   Labs show trops 0.13, BNP 1977, Hb 9.7 Na 128, K 5.6   CT PE: Acute pulmonary emboli throughout bilateral lobar and segmental pulmonary arteries (near complete occlusion of left lower lobe pulmonary arteries). RV:LV ratio greater than 1 with reflux of contrast to IVC, suspicious for right heart strain. Small bilateral pleural effusions with overlying compressive atelectasis. Left lower lobe opacification, which may represent combination of atelectasis and infarction, in the appropriate clinical   setting. Small pericardial effusion, measuring up to 1.4 cm in thickness.      #Acute hypoxic resp failure, due to acute PE / pleural effusion   with pleuritic cp, resolved  CTA 1/18 with bl segmental PE, RV strain  TTE 1/20 showed EF 45-50%, Mod AS, no RV strain  EKG stable  no intervention per IR  Currently on 2L Spo2 96% --> will try to wean off   Pulm eval noted: s/p Lasix x1 on 1/22 and 1/23. s/p Throacentesis 1/25 with 600mL drained--> CXR improved   was on Lovenox therapeutic 60 bid--> started on eliquis 10 BID on 1/25-->1/31 then will resume 5 BID  f/u o/p with pulm    #LUBA  venofer 200 for 3 days  folate and b12 normal      Patient is hemodynamically stable and ready for discharge on eliquis with outpatient follow up with Pulm

## 2023-01-25 NOTE — DISCHARGE NOTE PROVIDER - CARE PROVIDER_API CALL
Shy Edward)  Critical Care Medicine; Internal Medicine; Pulmonary Disease  [address] [place] [address]  Phone: [phone]  Fax: [phone]  Follow Up Time:

## 2023-01-26 ENCOUNTER — TRANSCRIPTION ENCOUNTER (OUTPATIENT)
Age: 88
End: 2023-01-26

## 2023-01-26 VITALS
DIASTOLIC BLOOD PRESSURE: 55 MMHG | HEART RATE: 78 BPM | SYSTOLIC BLOOD PRESSURE: 116 MMHG | RESPIRATION RATE: 18 BRPM | TEMPERATURE: 96 F

## 2023-01-26 LAB
HCT VFR BLD CALC: 32.4 % — LOW (ref 37–47)
HGB BLD-MCNC: 10.5 G/DL — LOW (ref 12–16)
MCHC RBC-ENTMCNC: 27.9 PG — SIGNIFICANT CHANGE UP (ref 27–31)
MCHC RBC-ENTMCNC: 32.4 G/DL — SIGNIFICANT CHANGE UP (ref 32–37)
MCV RBC AUTO: 85.9 FL — SIGNIFICANT CHANGE UP (ref 81–99)
NON-GYNECOLOGICAL CYTOLOGY STUDY: SIGNIFICANT CHANGE UP
NRBC # BLD: 0 /100 WBCS — SIGNIFICANT CHANGE UP (ref 0–0)
PH FLD: 7.9 — SIGNIFICANT CHANGE UP
PLATELET # BLD AUTO: 340 K/UL — SIGNIFICANT CHANGE UP (ref 130–400)
RBC # BLD: 3.77 M/UL — LOW (ref 4.2–5.4)
RBC # FLD: 14.3 % — SIGNIFICANT CHANGE UP (ref 11.5–14.5)
WBC # BLD: 6.73 K/UL — SIGNIFICANT CHANGE UP (ref 4.8–10.8)
WBC # FLD AUTO: 6.73 K/UL — SIGNIFICANT CHANGE UP (ref 4.8–10.8)

## 2023-01-26 PROCEDURE — 99239 HOSP IP/OBS DSCHRG MGMT >30: CPT

## 2023-01-26 RX ADMIN — PANTOPRAZOLE SODIUM 40 MILLIGRAM(S): 20 TABLET, DELAYED RELEASE ORAL at 05:39

## 2023-01-26 RX ADMIN — APIXABAN 10 MILLIGRAM(S): 2.5 TABLET, FILM COATED ORAL at 05:39

## 2023-01-26 RX ADMIN — Medication 100 MICROGRAM(S): at 05:39

## 2023-01-26 NOTE — DISCHARGE NOTE NURSING/CASE MANAGEMENT/SOCIAL WORK - PATIENT PORTAL LINK FT
You can access the FollowMyHealth Patient Portal offered by [facility] by registering at the following website: [email or web address]. By joining [facility]’s FollowMyHealth portal, you will also be able to view your health information using other applications (apps) compatible with our system.

## 2023-01-26 NOTE — DISCHARGE NOTE NURSING/CASE MANAGEMENT/SOCIAL WORK - NSDCPEFALRISK_GEN_ALL_CORE
For information on Fall & Injury Prevention, visit: [email or web address] OR  [email or web address] OR  https://www.cdc.gov/steadi/patient.html

## 2023-01-30 DIAGNOSIS — I26.99 OTHER PULMONARY EMBOLISM WITHOUT ACUTE COR PULMONALE: ICD-10-CM

## 2023-01-30 DIAGNOSIS — R74.01 ELEVATION OF LEVELS OF LIVER TRANSAMINASE LEVELS: ICD-10-CM

## 2023-01-30 DIAGNOSIS — E89.0 POSTPROCEDURAL HYPOTHYROIDISM: ICD-10-CM

## 2023-01-30 DIAGNOSIS — J98.11 ATELECTASIS: ICD-10-CM

## 2023-01-30 DIAGNOSIS — I31.39 OTHER PERICARDIAL EFFUSION (NONINFLAMMATORY): ICD-10-CM

## 2023-01-30 DIAGNOSIS — E87.5 HYPERKALEMIA: ICD-10-CM

## 2023-01-30 DIAGNOSIS — Z93.3 COLOSTOMY STATUS: ICD-10-CM

## 2023-01-30 DIAGNOSIS — J90 PLEURAL EFFUSION, NOT ELSEWHERE CLASSIFIED: ICD-10-CM

## 2023-01-30 DIAGNOSIS — E87.1 HYPO-OSMOLALITY AND HYPONATREMIA: ICD-10-CM

## 2023-01-30 DIAGNOSIS — Z85.038 PERSONAL HISTORY OF OTHER MALIGNANT NEOPLASM OF LARGE INTESTINE: ICD-10-CM

## 2023-01-30 DIAGNOSIS — Z90.49 ACQUIRED ABSENCE OF OTHER SPECIFIED PARTS OF DIGESTIVE TRACT: ICD-10-CM

## 2023-01-30 DIAGNOSIS — I51.7 CARDIOMEGALY: ICD-10-CM

## 2023-01-30 DIAGNOSIS — D64.9 ANEMIA, UNSPECIFIED: ICD-10-CM

## 2023-01-30 DIAGNOSIS — J96.01 ACUTE RESPIRATORY FAILURE WITH HYPOXIA: ICD-10-CM

## 2023-01-30 DIAGNOSIS — Z20.822 CONTACT WITH AND (SUSPECTED) EXPOSURE TO COVID-19: ICD-10-CM

## 2023-01-30 DIAGNOSIS — R00.0 TACHYCARDIA, UNSPECIFIED: ICD-10-CM

## 2023-01-30 DIAGNOSIS — Z85.850 PERSONAL HISTORY OF MALIGNANT NEOPLASM OF THYROID: ICD-10-CM

## 2023-01-30 LAB
CULTURE RESULTS: SIGNIFICANT CHANGE UP
SPECIMEN SOURCE: SIGNIFICANT CHANGE UP

## 2023-09-19 NOTE — PROGRESS NOTE ADULT - SUBJECTIVE AND OBJECTIVE BOX
Over Night Events: events noted, on NC, feels better  PHYSICAL EXAM    ICU Vital Signs Last 24 Hrs  T(C): 36.4 (19 Jan 2023 16:00), Max: 36.4 (19 Jan 2023 16:00)  T(F): 97.5 (19 Jan 2023 16:00), Max: 97.5 (19 Jan 2023 16:00)  HR: 75 (20 Jan 2023 00:00) (75 - 122)  BP: 111/64 (20 Jan 2023 00:00) (89/54 - 150/83)  BP(mean): 83 (20 Jan 2023 00:00) (69 - 83)  RR: 14 (20 Jan 2023 00:00) (14 - 19)  SpO2: 93% (20 Jan 2023 00:00) (92% - 99%)    O2 Parameters below as of 20 Jan 2023 04:00  Patient On (Oxygen Delivery Method): nasal cannula  O2 Flow (L/min): 3          General: ill looking  Lungs: dec bs both bases  Cardiovascular: KIRBY 2.6  Abdomen: Soft, Positive BS  Extremities: No clubbing   Skin: Warm  Neurological: Non focal       01-19-23 @ 07:01  -  01-20-23 @ 06:37  --------------------------------------------------------  IN:    Heparin Infusion: 60 mL    Oral Fluid: 500 mL  Total IN: 560 mL    OUT:    Voided (mL): 550 mL  Total OUT: 550 mL    Total NET: 10 mL          LABS:                          10.0   7.98  )-----------( 427      ( 19 Jan 2023 06:42 )             31.8                                               01-19    134<L>  |  98  |  15  ----------------------------<  109<H>  5.2<H>   |  23  |  0.6<L>    Ca    8.4      19 Jan 2023 06:42    TPro  5.7<L>  /  Alb  2.8<L>  /  TBili  0.3  /  DBili  x   /  AST  36  /  ALT  48<H>  /  AlkPhos  125<H>  01-19      PT/INR - ( 18 Jan 2023 20:52 )   PT: 16.60 sec;   INR: 1.44 ratio         PTT - ( 19 Jan 2023 12:51 )  PTT:93.6 sec                                           CARDIAC MARKERS ( 19 Jan 2023 06:42 )  x     / 0.11 ng/mL / x     / x     / x      CARDIAC MARKERS ( 19 Jan 2023 01:17 )  x     / 0.10 ng/mL / x     / x     / x      CARDIAC MARKERS ( 18 Jan 2023 16:58 )  x     / 0.13 ng/mL / x     / x     / x                                                LIVER FUNCTIONS - ( 19 Jan 2023 06:42 )  Alb: 2.8 g/dL / Pro: 5.7 g/dL / ALK PHOS: 125 U/L / ALT: 48 U/L / AST: 36 U/L / GGT: x                                                  Culture - Blood (collected 18 Jan 2023 16:58)  Source: .Blood Blood  Preliminary Report (19 Jan 2023 23:02):    No growth to date.    Culture - Blood (collected 18 Jan 2023 16:58)  Source: .Blood Blood  Preliminary Report (19 Jan 2023 23:02):    No growth to date.                                                                                           MEDICATIONS  (STANDING):  chlorhexidine 4% Liquid 1 Application(s) Topical <User Schedule>  enoxaparin Injectable 60 milliGRAM(s) SubCutaneous every 12 hours  levothyroxine 100 MICROGram(s) Oral daily  sodium zirconium cyclosilicate 5 Gram(s) Oral once         Comment: ?spider bite rxn Detail Level: Simple Render Risk Assessment In Note?: no

## 2023-10-09 NOTE — DISCHARGE NOTE NURSING/CASE MANAGEMENT/SOCIAL WORK - NSPROMEDSBROUGHTTOHOSP_GEN_A_NUR
Spoke to patient. She is doing PT at [facility].     Chioma Rand ATC, OT  Sports Medicine Assistant - Dr. Momo Sanderson   [facility] [place]      ----- Message -----  From: Molly Buck, PT  Sent: 10/9/2023   7:00 AM CDT  To: MAYELA Sanderson MD  Subject: Pt Update                                        Morning Dr. Sanderson,    Just wanted to make sure you were aware that Ms. Perkins has yet to attend a scheduled PT appt since I evaluated her. She said she was trying to return to work very early but has not asked to reschedule any of the appt she has cancelled. Based on eval, I was not anticipating any issues, but now that I have not seen her in over a week, I am unsure what her status would be. I will be giving her a call today to see if there are other times that would better.     Thanks,    Molly Buck PT, DPT, OCS       no

## 2025-01-27 NOTE — PATIENT PROFILE ADULT - NSPROGENSOURCEINFO_GEN_A_NUR
Physical Therapy Visit    Visit Type: Daily Treatment Note  Visit: 2  Referring Provider: Luis Carlos Mejia MD  Medical Diagnosis (from order): M24.271 - Ankle ligament laxity, right  M72.2 - Plantar fasciitis     SUBJECTIVE                                                                                                               Patient states there has been minor improvements in ankle pain with walking. Reports compliance with home exercise program and states that stretching feels good.     Pain / Symptoms  - Pain rating (out of 10): Current: 0 ; Worst: 7      OBJECTIVE                                                                                                                                 Treatment     Therapeutic Exercise  - Resisted dorsiflexion with green band, 3 x 10  - Foot intrinsics- yellow band under head of 1st met   *in seated and in single limb stance with upper extremity support  - Seated arch doming   *coin under head of first met for tactile cueing  - Forward and lateral lunge on dynadisc, 2 x 10 bilaterally  * cueing for hips back  - Calf stretcher, 1 x 30 seconds  - Resisted eversion with green band, 3 x 12 bilaterally      Neuromuscular Re-Education  Single limb stance on dynadisc, 3 x 15 seconds    Skilled input: verbal instruction/cues, tactile instruction/cues, demonstration and posture correction    Writer verbally educated and received verbal consent for hand placement, positioning of patient, and techniques to be performed today from patient for hand placement and palpation for techniques, therapist position for techniques and clothing adjustments for techniques as described above and how they are pertinent to the patient's plan of care.  Home Exercise Program  Access Code: [email or web address]  URL: [email or web address]  Date: 01/13/2025  Prepared by: Tevin Tyson    Exercises  - Seated Arch Lifts  - 1 x daily - 4 x weekly  - Single Leg Heel Raise with Chair Support  - 1 x  daily - 5 x weekly - 3 sets - 10 reps  - Single Leg Stance on Foam Pad  - 1 x daily - 5 x weekly - 3 sets - 15 hold  - Gastroc Stretch on Wall  - 1 x daily - 6 x weekly - 30 seconds hold      ASSESSMENT                                                                                                            Patient denied symptoms with all activity today. He demonstrated improved motor control of foot intrinsics compared to initial visit, but still recruits extrinsic muscles while performing arch doming. Demonstrates significant pronation with functional exercises such as lunging.   Pain/symptoms after session (out of 10): 0  Education:   - Results of above outlined education: Verbalizes understanding, Needs reinforcement and Demonstrates understanding    PLAN                                                                                                                           Suggestions for next session as indicated: Progress per plan of care  Heel walk, toe walk  Foot intrinsic strengthening in single limb stance with reduced upper extremity support  Resisted inversion       Therapy procedure time and total treatment time can be found documented on the Time Entry flowsheet     patient/family